# Patient Record
Sex: FEMALE | Race: NATIVE HAWAIIAN OR OTHER PACIFIC ISLANDER | NOT HISPANIC OR LATINO | Employment: FULL TIME | URBAN - METROPOLITAN AREA
[De-identification: names, ages, dates, MRNs, and addresses within clinical notes are randomized per-mention and may not be internally consistent; named-entity substitution may affect disease eponyms.]

---

## 2018-03-29 ENCOUNTER — HOSPITAL ENCOUNTER (INPATIENT)
Facility: MEDICAL CENTER | Age: 28
LOS: 1 days | End: 2018-03-30
Attending: OBSTETRICS & GYNECOLOGY | Admitting: OBSTETRICS & GYNECOLOGY
Payer: COMMERCIAL

## 2018-03-29 LAB
ALBUMIN SERPL BCP-MCNC: 3.4 G/DL (ref 3.2–4.9)
ALBUMIN/GLOB SERPL: 1.1 G/DL
ALP SERPL-CCNC: 215 U/L (ref 30–99)
ALT SERPL-CCNC: 9 U/L (ref 2–50)
ANION GAP SERPL CALC-SCNC: 10 MMOL/L (ref 0–11.9)
AST SERPL-CCNC: 18 U/L (ref 12–45)
BASOPHILS # BLD AUTO: 0.2 % (ref 0–1.8)
BASOPHILS # BLD: 0.03 K/UL (ref 0–0.12)
BILIRUB SERPL-MCNC: 0.5 MG/DL (ref 0.1–1.5)
BUN SERPL-MCNC: 8 MG/DL (ref 8–22)
CALCIUM SERPL-MCNC: 8.8 MG/DL (ref 8.5–10.5)
CHLORIDE SERPL-SCNC: 104 MMOL/L (ref 96–112)
CO2 SERPL-SCNC: 20 MMOL/L (ref 20–33)
CREAT SERPL-MCNC: 0.5 MG/DL (ref 0.5–1.4)
EOSINOPHIL # BLD AUTO: 0.13 K/UL (ref 0–0.51)
EOSINOPHIL NFR BLD: 1 % (ref 0–6.9)
ERYTHROCYTE [DISTWIDTH] IN BLOOD BY AUTOMATED COUNT: 40.9 FL (ref 35.9–50)
ERYTHROCYTE [DISTWIDTH] IN BLOOD BY AUTOMATED COUNT: 41.3 FL (ref 35.9–50)
GLOBULIN SER CALC-MCNC: 3 G/DL (ref 1.9–3.5)
GLUCOSE SERPL-MCNC: 112 MG/DL (ref 65–99)
HCT VFR BLD AUTO: 29.5 % (ref 37–47)
HCT VFR BLD AUTO: 33.8 % (ref 37–47)
HGB BLD-MCNC: 11.2 G/DL (ref 12–16)
HGB BLD-MCNC: 9.8 G/DL (ref 12–16)
HOLDING TUBE BB 8507: NORMAL
IMM GRANULOCYTES # BLD AUTO: 0.05 K/UL (ref 0–0.11)
IMM GRANULOCYTES NFR BLD AUTO: 0.4 % (ref 0–0.9)
LYMPHOCYTES # BLD AUTO: 2.21 K/UL (ref 1–4.8)
LYMPHOCYTES NFR BLD: 16.9 % (ref 22–41)
MCH RBC QN AUTO: 26.2 PG (ref 27–33)
MCH RBC QN AUTO: 26.5 PG (ref 27–33)
MCHC RBC AUTO-ENTMCNC: 33.1 G/DL (ref 33.6–35)
MCHC RBC AUTO-ENTMCNC: 33.2 G/DL (ref 33.6–35)
MCV RBC AUTO: 79.2 FL (ref 81.4–97.8)
MCV RBC AUTO: 79.7 FL (ref 81.4–97.8)
MONOCYTES # BLD AUTO: 0.58 K/UL (ref 0–0.85)
MONOCYTES NFR BLD AUTO: 4.4 % (ref 0–13.4)
NEUTROPHILS # BLD AUTO: 10.07 K/UL (ref 2–7.15)
NEUTROPHILS NFR BLD: 77.1 % (ref 44–72)
NRBC # BLD AUTO: 0 K/UL
NRBC BLD-RTO: 0 /100 WBC
PLATELET # BLD AUTO: 210 K/UL (ref 164–446)
PLATELET # BLD AUTO: 221 K/UL (ref 164–446)
PMV BLD AUTO: 12.3 FL (ref 9–12.9)
PMV BLD AUTO: 12.3 FL (ref 9–12.9)
POTASSIUM SERPL-SCNC: 3.7 MMOL/L (ref 3.6–5.5)
PROT SERPL-MCNC: 6.4 G/DL (ref 6–8.2)
RBC # BLD AUTO: 3.7 M/UL (ref 4.2–5.4)
RBC # BLD AUTO: 4.27 M/UL (ref 4.2–5.4)
SODIUM SERPL-SCNC: 134 MMOL/L (ref 135–145)
URATE SERPL-MCNC: 5.2 MG/DL (ref 1.9–8.2)
WBC # BLD AUTO: 13.1 K/UL (ref 4.8–10.8)
WBC # BLD AUTO: 14.7 K/UL (ref 4.8–10.8)

## 2018-03-29 PROCEDURE — 85025 COMPLETE CBC W/AUTO DIFF WBC: CPT

## 2018-03-29 PROCEDURE — 85027 COMPLETE CBC AUTOMATED: CPT

## 2018-03-29 PROCEDURE — 84550 ASSAY OF BLOOD/URIC ACID: CPT

## 2018-03-29 PROCEDURE — 770002 HCHG ROOM/CARE - OB PRIVATE (112)

## 2018-03-29 PROCEDURE — 304965 HCHG RECOVERY SERVICES

## 2018-03-29 PROCEDURE — 36415 COLL VENOUS BLD VENIPUNCTURE: CPT

## 2018-03-29 PROCEDURE — 10907ZC DRAINAGE OF AMNIOTIC FLUID, THERAPEUTIC FROM PRODUCTS OF CONCEPTION, VIA NATURAL OR ARTIFICIAL OPENING: ICD-10-PCS | Performed by: OBSTETRICS & GYNECOLOGY

## 2018-03-29 PROCEDURE — 700105 HCHG RX REV CODE 258

## 2018-03-29 PROCEDURE — 700111 HCHG RX REV CODE 636 W/ 250 OVERRIDE (IP)

## 2018-03-29 PROCEDURE — 59409 OBSTETRICAL CARE: CPT

## 2018-03-29 PROCEDURE — 700111 HCHG RX REV CODE 636 W/ 250 OVERRIDE (IP): Performed by: OBSTETRICS & GYNECOLOGY

## 2018-03-29 PROCEDURE — 80053 COMPREHEN METABOLIC PANEL: CPT

## 2018-03-29 RX ORDER — SODIUM CHLORIDE, SODIUM LACTATE, POTASSIUM CHLORIDE, CALCIUM CHLORIDE 600; 310; 30; 20 MG/100ML; MG/100ML; MG/100ML; MG/100ML
INJECTION, SOLUTION INTRAVENOUS
Status: COMPLETED
Start: 2018-03-29 | End: 2018-03-29

## 2018-03-29 RX ORDER — DOCUSATE SODIUM 100 MG/1
100 CAPSULE, LIQUID FILLED ORAL 2 TIMES DAILY PRN
Status: DISCONTINUED | OUTPATIENT
Start: 2018-03-29 | End: 2018-03-30 | Stop reason: HOSPADM

## 2018-03-29 RX ORDER — MISOPROSTOL 200 UG/1
600 TABLET ORAL
Status: DISCONTINUED | OUTPATIENT
Start: 2018-03-29 | End: 2018-03-30 | Stop reason: HOSPADM

## 2018-03-29 RX ORDER — SODIUM CHLORIDE, SODIUM LACTATE, POTASSIUM CHLORIDE, CALCIUM CHLORIDE 600; 310; 30; 20 MG/100ML; MG/100ML; MG/100ML; MG/100ML
INJECTION, SOLUTION INTRAVENOUS PRN
Status: DISCONTINUED | OUTPATIENT
Start: 2018-03-29 | End: 2018-03-30 | Stop reason: HOSPADM

## 2018-03-29 RX ORDER — OXYCODONE HYDROCHLORIDE AND ACETAMINOPHEN 5; 325 MG/1; MG/1
1 TABLET ORAL EVERY 4 HOURS PRN
Status: DISCONTINUED | OUTPATIENT
Start: 2018-03-29 | End: 2018-03-30 | Stop reason: HOSPADM

## 2018-03-29 RX ORDER — IBUPROFEN 600 MG/1
600 TABLET ORAL EVERY 6 HOURS PRN
Status: DISCONTINUED | OUTPATIENT
Start: 2018-03-29 | End: 2018-03-30 | Stop reason: HOSPADM

## 2018-03-29 RX ADMIN — Medication 2000 ML/HR: at 09:29

## 2018-03-29 RX ADMIN — Medication 125 ML/HR: at 10:49

## 2018-03-29 RX ADMIN — SODIUM CHLORIDE, POTASSIUM CHLORIDE, SODIUM LACTATE AND CALCIUM CHLORIDE 1000 ML: 600; 310; 30; 20 INJECTION, SOLUTION INTRAVENOUS at 07:10

## 2018-03-29 ASSESSMENT — PATIENT HEALTH QUESTIONNAIRE - PHQ9
2. FEELING DOWN, DEPRESSED, IRRITABLE, OR HOPELESS: NOT AT ALL
1. LITTLE INTEREST OR PLEASURE IN DOING THINGS: NOT AT ALL
SUM OF ALL RESPONSES TO PHQ9 QUESTIONS 1 AND 2: 0

## 2018-03-29 ASSESSMENT — LIFESTYLE VARIABLES
DO YOU DRINK ALCOHOL: NO
EVER_SMOKED: NEVER
EVER_SMOKED: NEVER
ALCOHOL_USE: NO

## 2018-03-29 ASSESSMENT — PAIN SCALES - GENERAL
PAINLEVEL_OUTOF10: 1
PAINLEVEL_OUTOF10: 0
PAINLEVEL_OUTOF10: 5
PAINLEVEL_OUTOF10: 0
PAINLEVEL_OUTOF10: 2

## 2018-03-29 NOTE — H&P
IDENTIFICATION:  This is a 28-year-old  2, para 1-0-0-1 with an EGA of   39-/7, who presents with a chief complaint of uterine contractions.    HISTORY OF PRESENT ILLNESS:  This is a patient of Dr. Parmar.  She has gotten   good prenatal care.  Prenatal care has been uncomplicated.  She had a   quad screen with elevated osd with pregnancy.  She is beta strep negative.  She presents today   at 39-7 with an EDC of , complaining of uterine contractions,   denies spontaneous rupture of membranes or vaginal bleeding, admits good fetal   movement, denies symptoms of PIH, headaches, visual changes or epigastric   pain.  Here in labor and delivery, fetal heart tracings reactive, category 1,   she is mana regularly.  Her cervix is 6, 90, -1 to 0 station.  Head is   well applied to the cervix.  She has been AROM clear.  She is not requesting   pain control at this time.  She has no other complaints.  No nausea, vomiting,   fever, chills, change in bowel or bladder habits.  She admits good fetal   movement.  Denies symptoms of PIH, headaches, visual changes or epigastric   pain.  She is quite excited to be here on labor and delivery.    OBSTETRIC HISTORY:  Significant for previous vaginal delivery.    GYNECOLOGIC HISTORY:  Denies STDs or abnormal Paps.    PAST MEDICAL HISTORY:  ALLERGIES:  None.    CURRENT MEDICATIONS:  Prenatal vitamins.    MEDICAL PROBLEMS:  None.    PAST SURGICAL HISTORY:  Negative.    SOCIAL HISTORY:  Denies alcohol, tobacco or drug use.    FAMILY HISTORY:  Noncontributory.    REVIEW OF SYSTEMS:  Review of systems x12 is negative per AMA standards   available in chart.    LABORATORY DATA:  She is Rh positive.  Her 1-hour was 111.  Her quad was   elevated for increased risk of OSD.  She had normal ultrasound.  She is   rubella immune.  She is beta strep negative.    PHYSICAL EXAMINATION:  VITAL SIGNS:  Patient is afebrile.  Vital signs within normal limits.  Fetal   heart tracings  reactive, category 1.  She is mana regularly.  GENERAL:  She is awake, alert, uncomfortable with contractions, no apparent   distress.  NECK:  Supple.  HEART:  Regular.  CHEST:  Clear.  BREASTS:  Symmetrical.  ABDOMEN:  Soft, gravid, pannus, nontender.  Positive bowel sounds x4.  EXTREMITIES:  Negative.  GYNECOLOGIC:  As above.    ASSESSMENT:  1.  At this time is pregnancy at 39-1/7 weeks.  2.  Labor.  3.  Elevated open spinal defect on quad screen 1 in 206 with normal   ultrasound.    PLAN:  At this time:  1.  Admit.  2.  Fetal heart tone and contraction monitoring.  3.  Pain control.  4.  Pitocin if needed.  5.  Anticipate normal spontaneous vaginal delivery.       ____________________________________     MD LEE Zhang / JAVIER    DD:  03/29/2018 07:55:09  DT:  03/29/2018 08:53:15    D#:  [id number]  Job#:  [id number]

## 2018-03-29 NOTE — PROGRESS NOTES
Patient voided post transfer. Patient educated to feed infant every 3 hours. Patient wants to wait on bath until father returns.

## 2018-03-29 NOTE — CARE PLAN
Problem: Altered physiologic condition related to immediate post-delivery state and potential for bleeding/hemorrhage  Goal: Patient physiologically stable as evidenced by normal lochia, palpable uterine involution and vital signs within normal limits    Intervention: Perform physical assessment and obtain vital signs on patient as directed in Intrapartum/Postpartum Standard of Care in Policy and Procedure manual  Patient transferred from labor and delivery without issue. Bands and cuddles verified at bedside #10. Patient denies prn pain medication upon transfer. Patient has family in room for assistance. Patient plans to breastfeed. Encouraged patient to call for assistance. Educated not to ambulate without assistance.

## 2018-03-29 NOTE — DELIVERY NOTE
DATE OF SERVICE:  2018    IDENTIFICATION:  This is a 28-year-old  2, para 1, who presents at   39-1/7 weeks complaining of uterine contractions.  Her cervix was 6 cm.  She   subsequently admitted with a category 1 strip, known beta strep negative.  She   was AROM clear.  She progressed over normal labor curve to complete with an   overall reassuring fetal heart tracing.  At complete, she was able to push the   baby down to a +3 station, extend the baby's head and delivered   atraumatically.  The nares and mouth were bulb suctioned.  There was no nuchal   cord.  The shoulders and body followed without complication.  The cord was   clamped and cut.  Cord gases were held.  The infant was handed off to awaiting   nursing team.  At this point, the placenta delivered intact 3-vessel cord.    The uterus firmed up nicely after 20 units of Pitocin.  Cervix was intact.    There were no lacerations.  Estimated blood loss was 300 mL.  The patient and   baby to recovery room in stable condition.    FINDINGS:  Include a male infant with Apgars of 8 and 8.  There were no   complications.       ____________________________________     MD LEE Zhang / JAVIER    DD:  2018 09:35:00  DT:  2018 16:36:00    D#:  [id number]  Job#:  [id number]

## 2018-03-29 NOTE — CONSULTS
Lactation Note:    Initial Consult    Met with MOB who states this is her second child and breast fed her first baby for 10 months.  Infant was born at 0924 this morning and is approximately 5.5 hours old.  MOB states infant has latched successfully onto the breast x 1 time this afternoon.  MOB declines pain and tissue breakdown to nipples/breast with breastfeeding.  Assistance offered with latching infant onto breast, but MOB declines at this time.    Encouraged MOB to feed infant on demand per feeding cues, but not to let infant go more than 3 hours without a feed.    Discussed signs of successful milk transfer and what to expect with breastfeeding in the first 12-24 hours following delivery.    MOB has [employer] medical insurance and states has a personal breast pump for home use.      Informed MOB on the outpatient lactation assistance available to her through the [facility] and invited her to attend the breastfeeding support group.    MOB verbalized understanding of all information provided to her and denies having any further questions at this time.  Encouraged to call for assistance as needed.

## 2018-03-29 NOTE — PROGRESS NOTES
0700: report received from Tutu Phillip. POC discussed, care assumed. IV started, admission completed.   0748: Dr. Gooden at bedside, SVE: /0, AROM: clear   0839: Dr. Gooden at bedside, SVE: 8, 0840: SVE:9  0915: pt states she is feeling pressure, Dr. Gooden notified, SVE: 920: Dr. Gooden at bedside: SVE: complete   0924: , viable baby boy    Recovery   Fundus: firm/ light/ at U  0949: pt up to bathroom, voided  1120: pt up to bathroom, sarita care performed, gown changed, pt transferred to postpartum, report given to Eliza PHILLIP

## 2018-03-29 NOTE — CARE PLAN
Problem: Infection  Goal: Will remain free from infection    Intervention: Implement standard precautions and perform hand washing before and after patient contact  Hand hygiene performed appropriately, education provided

## 2018-03-30 VITALS
SYSTOLIC BLOOD PRESSURE: 109 MMHG | HEIGHT: 66 IN | BODY MASS INDEX: 34.07 KG/M2 | HEART RATE: 81 BPM | WEIGHT: 212 LBS | TEMPERATURE: 98.1 F | OXYGEN SATURATION: 97 % | RESPIRATION RATE: 18 BRPM | DIASTOLIC BLOOD PRESSURE: 82 MMHG

## 2018-03-30 RX ORDER — IBUPROFEN 600 MG/1
600 TABLET ORAL EVERY 6 HOURS PRN
Qty: 30 TAB | Refills: 1 | Status: SHIPPED | OUTPATIENT
Start: 2018-03-30 | End: 2019-11-06

## 2018-03-30 RX ORDER — FERROUS GLUCONATE 324(38)MG
324 TABLET ORAL
Qty: 30 TAB | Refills: 0 | Status: SHIPPED | OUTPATIENT
Start: 2018-03-30 | End: 2018-03-30

## 2018-03-30 RX ORDER — FERROUS GLUCONATE 324(38)MG
324 TABLET ORAL
Qty: 30 TAB | Refills: 0 | Status: SHIPPED | OUTPATIENT
Start: 2018-03-30 | End: 2019-11-06

## 2018-03-30 RX ORDER — PSEUDOEPHEDRINE HCL 30 MG
100 TABLET ORAL 2 TIMES DAILY PRN
Qty: 60 CAP | Refills: 1 | Status: SHIPPED | OUTPATIENT
Start: 2018-03-30 | End: 2019-11-06

## 2018-03-30 ASSESSMENT — PAIN SCALES - GENERAL
PAINLEVEL_OUTOF10: 0

## 2018-03-30 ASSESSMENT — LIFESTYLE VARIABLES: EVER_SMOKED: NEVER

## 2018-03-30 NOTE — DISCHARGE SUMMARY
Discharge Summary:      Renetta Nichols      Admit Date:   3/29/2018  Discharge Date:  3/30/2018     Admitting diagnosis:  Pregnancy  Labor and delivery, indication for care  Discharge Diagnosis: Status post vaginal, spontaneous.  Pregnancy Complications: none  Tubal Ligation:  no        History:  Past Medical History:   Diagnosis Date   • Pregnant      OB History    Para Term  AB Living   2 1           SAB TAB Ectopic Molar Multiple Live Births                    # Outcome Date GA Lbr Ashok/2nd Weight Sex Delivery Anes PTL Lv   2             1 Para                    Patient has no known allergies.  There are no active problems to display for this patient.       Hospital Course:   28 y.o. , now para 2, was admitted with the above mentioned diagnosis, underwent Active Labor, vaginal, spontaneous. Patient postpartum course was unremarkable, with progressive advancement in diet , ambulation and toleration of oral analgesia. Patient without complaints today and desires discharge.      Vitals:    18 0738 18 0800 18 1230 18 2000   BP:  138/87 100/67 105/76   Pulse:  71 87 91   Resp:   18 19   Temp: 36.8 °C (98.2 °F)  36.9 °C (98.4 °F) 37.3 °C (99.2 °F)   SpO2:   96% 93%   Weight:       Height:           Current Facility-Administered Medications   Medication Dose   • LR infusion     • miSOPROStol (CYTOTEC) tablet 600 mcg  600 mcg   • docusate sodium (COLACE) capsule 100 mg  100 mg   • ibuprofen (MOTRIN) tablet 600 mg  600 mg   • oxyCODONE-acetaminophen (PERCOCET) 5-325 MG per tablet 1 Tab  1 Tab   • oxytocin (PITOCIN) 20 UNITS/1000ML LR (postpartum)   mL/hr   • oxytocin (PITOCIN) 20 UNITS/1000ML LR (postpartum)  2,000 mL/hr       Exam:  Gen: NAD, AAO  Abdomen: Abdomen soft, non-tender. No masses,  No organomegally, FF @ U-1. No rebound/guarding.  Fundus Tender: No  Incision: none  Extremity: no edema, no redness or tenderness in the calves or thighs, 2+DPP, Homans sign  is negative, no sign of DVT      Labs:  Recent Labs      03/29/18   0706  03/29/18 2022   WBC  13.1*  14.7*   RBC  4.27  3.70*   HEMOGLOBIN  11.2*  9.8*   HEMATOCRIT  33.8*  29.5*   MCV  79.2*  79.7*   MCH  26.2*  26.5*   MCHC  33.1*  33.2*   RDW  40.9  41.3   PLATELETCT  221  210   MPV  12.3  12.3        Activity:   Discharge to home  Pelvic Rest x 6 weeks    Assessment:  normal postpartum course  Discharge Assessment: Taking adequate diet and fluids     Follow up: 6wk with Corinne E Capurro, M.D.       Discharge Meds:   Current Outpatient Prescriptions   Medication Sig Dispense Refill   • ibuprofen (MOTRIN) 600 MG Tab Take 1 Tab by mouth every 6 hours as needed. 30 Tab 1   • docusate sodium 100 MG Cap Take 100 mg by mouth 2 times a day as needed for Constipation. 60 Cap 1   • ferrous gluconate (FERGON) 324 (38 Fe) MG Tab Take 1 Tab by mouth every morning with breakfast. 30 Tab 0       Jersey Martinez M.D.

## 2018-03-30 NOTE — DISCHARGE INSTRUCTIONS
POSTPARTUM DISCHARGE INSTRUCTIONS FOR MOM    YOB: 1990   Age: 28 y.o.               Admit Date: 3/29/2018     Discharge Date: 3/30/2018  Attending Doctor:  Corinne E Capurro, M.D.                  Allergies:  Patient has no known allergies.    Discharged to home by car. Discharged via wheelchair, hospital escort: Yes.  Special equipment needed: Not Applicable  Belongings with: Personal  Be sure to schedule a follow-up appointment with your primary care doctor or any specialists as instructed.     Discharge Plan:   Diet Plan: Discussed  Activity Level: Discussed  Confirmed Follow up Appointment: Patient to Call and Schedule Appointment  Confirmed Symptoms Management: Discussed  Medication Reconciliation Updated: Yes  Influenza Vaccine Indication: Not indicated: Previously immunized this influenza season and > 8 years of age    REASONS TO CALL YOUR OBSTETRICIAN:  1.   Persistent fever or shaking chills (Temperature higher than 100.4)  2.   Heavy bleeding (soaking more than 1 pad per hour); Passing clots  3.   Foul odor from vagina  4.   Mastitis (Breast infection; breast pain, chills, fever, redness)  5.   Urinary pain, burning or frequency  6.   Episiotomy infection  7.   Abdominal incision infection  8.   Severe depression longer than 24 hours    HAND WASHING  · Prior to handling the baby.  · Before breastfeeding or bottle feeding baby.  · After using the bathroom or changing the baby's diaper.    WOUND CARE  Ask your physician for additional care instructions.  In general:    ·  Incision:      · Keep clean and dry.    · Do NOT lift anything heavier than your baby for up to 6 weeks.    · There should not be any opening or pus.      VAGINAL CARE  · Nothing inside vagina for 6 weeks: no sexual intercourse, tampons or douching.  · Bleeding may continue for 2-4 weeks.  Amount may vary.    · Call your physician for heavy bleeding which means soaking more than 1 pad per hour    BIRTH CONTROL  · It is  "possible to become pregnant at any time after delivery and while breastfeeding.  · Plan to discuss a method of birth control with your physician at your follow up visit. visit.    DIET AND ELIMINATION  · Eating more fiber (bran cereal, fruits, and vegetables) and drinking plenty of fluids will help to avoid constipation.  · Urinary frequency after childbirth is normal.    POSTPARTUM BLUES  During the first few days after birth, you may experience a sense of the \"blues\" which may include impatience, irritability or even crying.  These feeling come and go quickly.  However, as many as 1 in 10 women experience emotional symptoms known as postpartum depression.    Postpartum depression:  May start as early as the second or third day after delivery or take several weeks or months to develop.  Symptoms of \"blues\" are present, but are more intense:  Crying spells; loss of appetite; feelings of hopelessness or loss of control; fear of touching the baby; over concern or no concern at all about the baby; little or no concern about your own appearance/caring for yourself; and/or inability to sleep or excessive sleeping.  Contact your physician if you are experiencing any of these symptoms.    Crisis Hotline:  · [place] Crisis Hotline:  1-244-YLFGWJJ  Or 1-797.253.3450  · [place] Crisis Hotline:  1-847.741.1694  Or 182-126-0328    PREVENTING SHAKEN BABY:  If you are angry or stressed, PUT THE BABY IN THE CRIB, step away, take some deep breaths, and wait until you are calm to care for the baby.  DO NOT SHAKE THE BABY.  You are not alone, call a supporter for help.    · Crisis [facility] Center 24/7 crisis line 744-846-3081 or 1-876.805.8842  · You can also text them, text \"ANSWER\" to 561598    QUIT SMOKING/TOBACCO USE:  I understand the use of any tobacco products increases my chance of suffering from future heart disease and could cause other illnesses which may shorten my life. Quitting the use of tobacco products is the single most " important thing I can do to improve my health. For further information on smoking / tobacco cessation call a Toll Free Quit Line at 1-859.452.6393 (*[employer] [place]) or 1-131.171.9526 (American Lung Association) or you can access the web based program at www.lungusa.org.    · [place] Tobacco Users Help Line:  (844) 373-2877       Toll Free: 1-673.235.2609  · Quit Tobacco Program [facility] (468)070-0531    DEPRESSION / SUICIDE RISK:  As you are discharged from this [facility], it is important to learn how to keep safe from harming yourself.    Recognize the warning signs:  · Abrupt changes in personality, positive or negative- including increase in energy   · Giving away possessions  · Change in eating patterns- significant weight changes-  positive or negative  · Change in sleeping patterns- unable to sleep or sleeping all the time   · Unwillingness or inability to communicate  · Depression  · Unusual sadness, discouragement and loneliness  · Talk of wanting to die  · Neglect of personal appearance   · Rebelliousness- reckless behavior  · Withdrawal from people/activities they love  · Confusion- inability to concentrate     If you or a loved one observes any of these behaviors or has concerns about self-harm, here's what you can do:  · Talk about it- your feelings and reasons for harming yourself  · Remove any means that you might use to hurt yourself (examples: pills, rope, extension cords, firearm)  · Get professional help from the community (Mental Health, Substance Abuse, psychological counseling)  · Do not be alone:Call your Safe Contact- someone whom you trust who will be there for you.  · Call your local CRISIS HOTLINE 348-5581 or 727-046-9852  · Call your local Children's Mobile Crisis Response Team [place] (740) 515-1524 or www.[employer]  · Call the toll free National Suicide Prevention Hotlines   · National Suicide Prevention Lifeline 660-420-DTSG  (5996)  · [facility] 800-SUICIDE (241-4856)    DISCHARGE SURVEY:  Thank you for choosing [facility].  We hope we provided you with very good care.  You may be receiving a survey in the mail.  Please fill it out.  Your opinion is valuable to us.    ADDITIONAL EDUCATIONAL MATERIALS GIVEN TO PATIENT:        My signature on this form indicates that:  1.  I have reviewed and understand the above information  2.  My questions regarding this information have been answered to my satisfaction.  3.  I have formulated a plan with my discharge nurse to obtain my prescribed medication for home.

## 2018-03-30 NOTE — PROGRESS NOTES
Assessment complete. VSS. Fundus firm, lochia light. Pt denies pain at this time, will medicate with PRN pain meds per MAR as needed. FOB and family at bedside bonding with pt and baby. States voiding without difficulty. POC discussed. Encouraged to call with needs. Call light in place

## 2018-03-30 NOTE — PROGRESS NOTES
0700 - Bedside report received from JASSON Garcia. Patient care assumed. Chart and orders reviewed  1000 - Pt assessment complete, wnl. Fundus firm with minimal discharge. Pt ambulating to bathroom and voiding without difficulty. Patient denies any dizziness or lightheadedness at this time. Patient denies any calf pain or tenderness at this time. Reviewed use of emergency light. Plan of care discussed with patient for the day. Pain medication plan discussed with patient; patient requesting not to be woken up if pain medications are available; patient states she will call if PRN pain medication is wanted. All questions/concerns addressed at this time. Encouraged to call with needs, will monitor

## 2018-03-30 NOTE — PROGRESS NOTES
Lactation Note:    MOB states breastfeeding is going well and declines assistance with breastfeeding at this time.  Denies pain and tissue breakdown at nipples/breast with breastfeeding.  MOB continues to nurse infant every 2-3 hours and reiterated that infant should not be allowed to go more than 3 hours without a feed.  MOB verbalized understanding.      Reviewed signs of successful milk transfer with MOB.    Infant's weight loss down 3.5% at 11.5 hours.  Infant voiding and stooling within defined limits.    TIFFANIE is a participant of the [facility] program.  She is aware of the outpatient lactation assistance available to her through [facility] and the [facility].    MOB verbalized understanding of all information given to her and denies having any further questions at this time.  Encouraged to call for assistance as needed.

## 2018-03-30 NOTE — CARE PLAN
Problem: Altered physiologic condition related to immediate post-delivery state and potential for bleeding/hemorrhage  Goal: Patient physiologically stable as evidenced by normal lochia, palpable uterine involution and vital signs within normal limits  Outcome: PROGRESSING AS EXPECTED  VSS. Fundus firm. Lochia light.    Problem: Alteration in comfort related to episiotomy, vaginal repair and/or after birth pains  Goal: Patient verbalizes acceptable pain level  Outcome: PROGRESSING AS EXPECTED  Pt denies pain at this time. Will medicate with PRN pain meds per MAR as needed throughout shift.

## 2019-07-25 ENCOUNTER — TELEPHONE (OUTPATIENT)
Dept: SCHEDULING | Facility: IMAGING CENTER | Age: 29
End: 2019-07-25

## 2019-09-09 ENCOUNTER — OFFICE VISIT (OUTPATIENT)
Dept: MEDICAL GROUP | Facility: LAB | Age: 29
End: 2019-09-09
Payer: COMMERCIAL

## 2019-09-09 VITALS
HEART RATE: 64 BPM | HEIGHT: 64 IN | TEMPERATURE: 98.2 F | WEIGHT: 182.8 LBS | RESPIRATION RATE: 18 BRPM | OXYGEN SATURATION: 98 % | SYSTOLIC BLOOD PRESSURE: 108 MMHG | BODY MASS INDEX: 31.21 KG/M2 | DIASTOLIC BLOOD PRESSURE: 64 MMHG

## 2019-09-09 DIAGNOSIS — Z00.00 WELL ADULT EXAM: ICD-10-CM

## 2019-09-09 DIAGNOSIS — E66.9 OBESITY (BMI 30-39.9): ICD-10-CM

## 2019-09-09 PROCEDURE — 99385 PREV VISIT NEW AGE 18-39: CPT | Performed by: NURSE PRACTITIONER

## 2019-09-09 SDOH — HEALTH STABILITY: MENTAL HEALTH: HOW OFTEN DO YOU HAVE A DRINK CONTAINING ALCOHOL?: NOT ASKED

## 2019-09-09 ASSESSMENT — PATIENT HEALTH QUESTIONNAIRE - PHQ9: CLINICAL INTERPRETATION OF PHQ2 SCORE: 0

## 2019-09-09 NOTE — LETTER
[facility]  JEANMARIE CohenREliaN.  [address] [id number]  [facility] [address]  Fax: [phone]   Authorization for Release/Disclosure of   Protected Health Information   Name: RENETTA TRIPP : 1990 SSN: [id number]   Address: [address] [place] [address] Phone:    [phone] (home)    I authorize the entity listed below to release/disclose the PHI below to:   [facility]/COSMO CohenP.RMYRIAM and ADRIANO Cohen   Provider or Entity Name: [facility]   Address   City, [facility], [facility]   Phone:[phone]    Fax:[phone]   Reason for request: continuity of care   Information to be released:    [  ] LAST COLONOSCOPY,  including any PATH REPORT and follow-up  [  ] LAST FIT/COLOGUARD RESULT [  ] LAST DEXA  [  ] LAST MAMMOGRAM  [x] LAST PAP  [  ] LAST LABS [  ] RETINA EXAM REPORT  [  ] IMMUNIZATION RECORDS  [  ] Release all info      [  ] Check here and initial the line next to each item to release ALL health information INCLUDING  _____ Care and treatment for drug and / or alcohol abuse  _____ HIV testing, infection status, or AIDS  _____ Genetic Testing    DATES OF SERVICE OR TIME PERIOD TO BE DISCLOSED: _____________  I understand and acknowledge that:  * This Authorization may be revoked at any time by you in writing, except if your health information has already been used or disclosed.  * Your health information that will be used or disclosed as a result of you signing this authorization could be re-disclosed by the recipient. If this occurs, your re-disclosed health information may no longer be protected by State or Federal laws.  * You may refuse to sign this Authorization. Your refusal will not affect your ability to obtain treatment.  * This Authorization becomes effective upon signing and will  on (date) __________.      If no date is indicated, this Authorization will  one (1) year from the signature date.    Name: Renetta Tripp    Signature:   Date:     9/9/2019       PLEASE FAX REQUESTED RECORDS BACK TO: [phone]

## 2019-10-30 LAB
25(OH)D3+25(OH)D2 SERPL-MCNC: 12.8 NG/ML (ref 30–100)
ALBUMIN SERPL-MCNC: 4.5 G/DL (ref 3.5–5.5)
ALBUMIN/GLOB SERPL: 1.6 {RATIO} (ref 1.2–2.2)
ALP SERPL-CCNC: 55 IU/L (ref 39–117)
ALT SERPL-CCNC: 17 IU/L (ref 0–32)
AST SERPL-CCNC: 21 IU/L (ref 0–40)
BASOPHILS # BLD AUTO: 0 X10E3/UL (ref 0–0.2)
BASOPHILS NFR BLD AUTO: 0 %
BILIRUB SERPL-MCNC: 0.3 MG/DL (ref 0–1.2)
BUN SERPL-MCNC: 8 MG/DL (ref 6–20)
BUN/CREAT SERPL: 12 (ref 9–23)
CALCIUM SERPL-MCNC: 9.1 MG/DL (ref 8.7–10.2)
CHLORIDE SERPL-SCNC: 107 MMOL/L (ref 96–106)
CHOLEST SERPL-MCNC: 161 MG/DL (ref 100–199)
CO2 SERPL-SCNC: 20 MMOL/L (ref 20–29)
CREAT SERPL-MCNC: 0.68 MG/DL (ref 0.57–1)
EOSINOPHIL # BLD AUTO: 0.2 X10E3/UL (ref 0–0.4)
EOSINOPHIL NFR BLD AUTO: 3 %
ERYTHROCYTE [DISTWIDTH] IN BLOOD BY AUTOMATED COUNT: 18 % (ref 12.3–15.4)
FERRITIN SERPL-MCNC: 6 NG/ML (ref 15–150)
GLOBULIN SER CALC-MCNC: 2.8 G/DL (ref 1.5–4.5)
GLUCOSE SERPL-MCNC: 90 MG/DL (ref 65–99)
HCT VFR BLD AUTO: 38.2 % (ref 34–46.6)
HDLC SERPL-MCNC: 73 MG/DL
HGB BLD-MCNC: 12 G/DL (ref 11.1–15.9)
IMM GRANULOCYTES # BLD AUTO: 0 X10E3/UL (ref 0–0.1)
IMM GRANULOCYTES NFR BLD AUTO: 0 %
IMMATURE CELLS  115398: ABNORMAL
LABORATORY COMMENT REPORT: NORMAL
LDLC SERPL CALC-MCNC: 76 MG/DL (ref 0–99)
LYMPHOCYTES # BLD AUTO: 2.7 X10E3/UL (ref 0.7–3.1)
LYMPHOCYTES NFR BLD AUTO: 43 %
MCH RBC QN AUTO: 23.6 PG (ref 26.6–33)
MCHC RBC AUTO-ENTMCNC: 31.4 G/DL (ref 31.5–35.7)
MCV RBC AUTO: 75 FL (ref 79–97)
MONOCYTES # BLD AUTO: 0.4 X10E3/UL (ref 0.1–0.9)
MONOCYTES NFR BLD AUTO: 6 %
MORPHOLOGY BLD-IMP: ABNORMAL
NEUTROPHILS # BLD AUTO: 3.1 X10E3/UL (ref 1.4–7)
NEUTROPHILS NFR BLD AUTO: 48 %
NRBC BLD AUTO-RTO: ABNORMAL %
PLATELET # BLD AUTO: 362 X10E3/UL (ref 150–450)
POTASSIUM SERPL-SCNC: 3.7 MMOL/L (ref 3.5–5.2)
PROT SERPL-MCNC: 7.3 G/DL (ref 6–8.5)
RBC # BLD AUTO: 5.09 X10E6/UL (ref 3.77–5.28)
SODIUM SERPL-SCNC: 142 MMOL/L (ref 134–144)
TRIGL SERPL-MCNC: 59 MG/DL (ref 0–149)
TSH SERPL DL<=0.005 MIU/L-ACNC: 1.58 UIU/ML (ref 0.45–4.5)
VLDLC SERPL CALC-MCNC: 12 MG/DL (ref 5–40)
WBC # BLD AUTO: 6.4 X10E3/UL (ref 3.4–10.8)

## 2019-11-06 ENCOUNTER — HOSPITAL ENCOUNTER (EMERGENCY)
Facility: MEDICAL CENTER | Age: 29
End: 2019-11-06
Attending: EMERGENCY MEDICINE
Payer: COMMERCIAL

## 2019-11-06 ENCOUNTER — APPOINTMENT (OUTPATIENT)
Dept: RADIOLOGY | Facility: MEDICAL CENTER | Age: 29
End: 2019-11-06
Attending: EMERGENCY MEDICINE
Payer: COMMERCIAL

## 2019-11-06 VITALS
BODY MASS INDEX: 29.62 KG/M2 | HEIGHT: 66 IN | WEIGHT: 184.3 LBS | RESPIRATION RATE: 16 BRPM | HEART RATE: 66 BPM | SYSTOLIC BLOOD PRESSURE: 107 MMHG | OXYGEN SATURATION: 96 % | DIASTOLIC BLOOD PRESSURE: 76 MMHG | TEMPERATURE: 98.9 F

## 2019-11-06 DIAGNOSIS — M54.50 LUMBAR BACK PAIN: ICD-10-CM

## 2019-11-06 DIAGNOSIS — N39.0 ACUTE UTI: ICD-10-CM

## 2019-11-06 LAB
APPEARANCE UR: CLEAR
BACTERIA #/AREA URNS HPF: ABNORMAL /HPF
BILIRUB UR QL STRIP.AUTO: NEGATIVE
COLOR UR: YELLOW
EPI CELLS #/AREA URNS HPF: ABNORMAL /HPF
GLUCOSE UR STRIP.AUTO-MCNC: NEGATIVE MG/DL
HCG UR QL: NEGATIVE
HYALINE CASTS #/AREA URNS LPF: ABNORMAL /LPF
KETONES UR STRIP.AUTO-MCNC: NEGATIVE MG/DL
LEUKOCYTE ESTERASE UR QL STRIP.AUTO: ABNORMAL
MICRO URNS: ABNORMAL
NITRITE UR QL STRIP.AUTO: NEGATIVE
PH UR STRIP.AUTO: 7 [PH] (ref 5–8)
PROT UR QL STRIP: NEGATIVE MG/DL
RBC # URNS HPF: ABNORMAL /HPF
RBC UR QL AUTO: NEGATIVE
SP GR UR STRIP.AUTO: 1.03
UROBILINOGEN UR STRIP.AUTO-MCNC: 0.2 MG/DL
WBC #/AREA URNS HPF: ABNORMAL /HPF

## 2019-11-06 PROCEDURE — 74176 CT ABD & PELVIS W/O CONTRAST: CPT

## 2019-11-06 PROCEDURE — 87086 URINE CULTURE/COLONY COUNT: CPT

## 2019-11-06 PROCEDURE — 81001 URINALYSIS AUTO W/SCOPE: CPT

## 2019-11-06 PROCEDURE — 81025 URINE PREGNANCY TEST: CPT

## 2019-11-06 PROCEDURE — 99284 EMERGENCY DEPT VISIT MOD MDM: CPT

## 2019-11-06 PROCEDURE — A9270 NON-COVERED ITEM OR SERVICE: HCPCS | Performed by: EMERGENCY MEDICINE

## 2019-11-06 PROCEDURE — 700102 HCHG RX REV CODE 250 W/ 637 OVERRIDE(OP): Performed by: EMERGENCY MEDICINE

## 2019-11-06 RX ORDER — SULFAMETHOXAZOLE AND TRIMETHOPRIM 800; 160 MG/1; MG/1
1 TABLET ORAL 2 TIMES DAILY
Qty: 14 TAB | Refills: 0 | Status: SHIPPED | OUTPATIENT
Start: 2019-11-06 | End: 2019-11-13

## 2019-11-06 RX ORDER — IBUPROFEN 600 MG/1
600 TABLET ORAL ONCE
Status: COMPLETED | OUTPATIENT
Start: 2019-11-06 | End: 2019-11-06

## 2019-11-06 RX ADMIN — IBUPROFEN 600 MG: 600 TABLET ORAL at 16:17

## 2019-11-06 SDOH — HEALTH STABILITY: MENTAL HEALTH: HOW OFTEN DO YOU HAVE A DRINK CONTAINING ALCOHOL?: MONTHLY OR LESS

## 2019-11-06 ASSESSMENT — LIFESTYLE VARIABLES: DO YOU DRINK ALCOHOL: NO

## 2019-11-07 NOTE — DISCHARGE INSTRUCTIONS
Your urine contains signs of a possible infection with white blood cells    A urine culture is being processed and will be back in 24 to 48 hours    We will give you an antibiotic for now in case this is an infection    For the pain, take Tylenol 650 mg every 4 hours and/or ibuprofen/Motrin 600 mg every 6 hours    Apply salon pas to the affected area    Return to the ER for fever, vomiting, increased pain, weakness, incontinence, numbness, or other concerns
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
## 2019-11-07 NOTE — ED NOTES
PT VU DCIs with strict return precautions and follow up w/pcp. PT to start abx pending urine culture. Pt left A&OX4 amb with steady gait to discharge. Pt left with .

## 2019-11-08 LAB
BACTERIA UR CULT: ABNORMAL
BACTERIA UR CULT: ABNORMAL
SIGNIFICANT IND 70042: ABNORMAL
SITE SITE: ABNORMAL
SOURCE SOURCE: ABNORMAL

## 2019-11-10 NOTE — ED NOTES
"ED Positive Culture Follow-up/Notification Note:   Date: 11/10/19    Patient seen in the ED on 11/6/2019 for left side lower back pain.   1. Lumbar back pain    2. Acute UTI      Discharge Medication List as of 11/6/2019  5:04 PM      START taking these medications    Details   sulfamethoxazole-trimethoprim (BACTRIM DS) 800-160 MG tablet Take 1 Tab by mouth 2 times a day for 7 days., Disp-14 Tab, R-0, Normal           Allergies: Patient has no known allergies.    Vitals:    11/06/19 1401 11/06/19 1426 11/06/19 1717   BP: 111/67  107/76   Pulse: 73  66   Resp: 15  16   Temp: 37.1 °C (98.8 °F)  37.2 °C (98.9 °F)   TempSrc:   Oral   SpO2: 95%  96%   Weight:  83.6 kg (184 lb 4.9 oz)    Height: 1.676 m (5' 6\")         Final cultures:   Results     Procedure Component Value Units Date/Time    URINE CULTURE(NEW) [[id number]]  (Abnormal) Collected:  11/06/19 1452    Order Status:  Completed Specimen:  Urine Updated:  11/08/19 0935     Significant Indicator POS     Source UR     Site -     Culture Result -      Probable Gardnerella vaginalis  >100,000 cfu/mL      URINALYSIS,CULTURE IF INDICATED [[id number]]  (Abnormal) Collected:  11/06/19 1452    Order Status:  Completed Specimen:  Urine Updated:  11/06/19 1524     Color Yellow     Character Clear     Specific Gravity 1.026     Ph 7.0     Glucose Negative mg/dL      Ketones Negative mg/dL      Protein Negative mg/dL      Bilirubin Negative     Urobilinogen, Urine 0.2     Nitrite Negative     Leukocyte Esterase Small     Occult Blood Negative     Micro Urine Req Microscopic          Plan:   G. Vaginalis is a common urogenital colonizer and not likely the true urinary pathogen.  Patient does not present with symptoms consistent with BV at time of visit. SMX-TMP will provide good empiric coverage against urinary pathogens. No need to change antimicrobials based on C&S data.      Addendum: Pt returned my call c/o s/s of BV. Discussed culture result and change in antibiotics with " patient, who will  new prescription at [facility].    New Rx:  Metronidazole 500 mg BID x 7 days #14, no refills - MD: Chaitanya per protocol    Kiersten Buckley, MauryD

## 2019-11-12 DIAGNOSIS — B96.89 BACTERIAL VAGINOSIS: ICD-10-CM

## 2019-11-12 DIAGNOSIS — N76.0 BACTERIAL VAGINOSIS: ICD-10-CM

## 2019-11-12 RX ORDER — METRONIDAZOLE 500 MG/1
500 TABLET ORAL 2 TIMES DAILY
Qty: 14 TAB | Refills: 0 | Status: SHIPPED | OUTPATIENT
Start: 2019-11-12 | End: 2019-11-19

## 2020-11-14 ENCOUNTER — TELEMEDICINE (OUTPATIENT)
Dept: TELEHEALTH | Facility: TELEMEDICINE | Age: 30
End: 2020-11-14
Payer: COMMERCIAL

## 2020-11-14 ENCOUNTER — HOSPITAL ENCOUNTER (OUTPATIENT)
Dept: LAB | Facility: MEDICAL CENTER | Age: 30
End: 2020-11-14
Attending: NURSE PRACTITIONER
Payer: COMMERCIAL

## 2020-11-14 VITALS — BODY MASS INDEX: 32.14 KG/M2 | WEIGHT: 200 LBS | HEIGHT: 66 IN

## 2020-11-14 DIAGNOSIS — R05.9 COUGH: ICD-10-CM

## 2020-11-14 DIAGNOSIS — Z20.822 ENCOUNTER BY TELEHEALTH FOR SUSPECTED COVID-19: ICD-10-CM

## 2020-11-14 PROCEDURE — U0003 INFECTIOUS AGENT DETECTION BY NUCLEIC ACID (DNA OR RNA); SEVERE ACUTE RESPIRATORY SYNDROME CORONAVIRUS 2 (SARS-COV-2) (CORONAVIRUS DISEASE [COVID-19]), AMPLIFIED PROBE TECHNIQUE, MAKING USE OF HIGH THROUGHPUT TECHNOLOGIES AS DESCRIBED BY CMS-2020-01-R: HCPCS

## 2020-11-14 PROCEDURE — C9803 HOPD COVID-19 SPEC COLLECT: HCPCS

## 2020-11-14 PROCEDURE — 99214 OFFICE O/P EST MOD 30 MIN: CPT | Mod: 95,CR,CS | Performed by: NURSE PRACTITIONER

## 2020-11-14 ASSESSMENT — ENCOUNTER SYMPTOMS
ABDOMINAL PAIN: 0
FEVER: 0
CONSTIPATION: 0
SORE THROAT: 1
HEADACHES: 0
CHILLS: 0
VOMITING: 0
SHORTNESS OF BREATH: 0
MYALGIAS: 0
WHEEZING: 0
COUGH: 1
NAUSEA: 0
DIARRHEA: 0

## 2020-11-14 ASSESSMENT — FIBROSIS 4 INDEX: FIB4 SCORE: 0.42

## 2020-11-14 NOTE — PROGRESS NOTES
"Telemedicine Visit: Established Patient     This evaluation was conducted via [employer] secure and encrypted videoconferencing technology. VV in [place]    Subjective:   CC: COVID symptoms  Renetta Nichols is a 30 y.o. female presenting for evaluation and management of covid symptoms including sore throat, congestion, loss of taste and smell and dry cough. symptoms started 5 days ago. She has seasonal allergies has taken some allergy medications with didn't help. Denies known exposure to covid     Review of Systems   Constitutional: Negative for chills, fever and malaise/fatigue.   HENT: Positive for congestion and sore throat.    Respiratory: Positive for cough. Negative for shortness of breath and wheezing.    Gastrointestinal: Negative for abdominal pain, constipation, diarrhea, nausea and vomiting.   Musculoskeletal: Negative for myalgias.   Neurological: Negative for headaches.        Current medicines (including changes today)  Medications:    • This patient does not have an active medication from one of the medication groupers.    Allergies: Patient has no known allergies.    Problem List: Renetta Nichols has Obesity (BMI 30-39.9) on their problem list.    Surgical History:  No past surgical history on file.    Past Social Hx: Renetta Nichols  reports that she has never smoked. She has never used smokeless tobacco. She reports current alcohol use. She reports that she does not use drugs.     Past Family Hx:  Renetta Nichols family history includes Diabetes in her paternal aunt; No Known Problems in her brother, father, mother, and sister.     Problem list, medications, and allergies reviewed by myself today in Epic.     Objective:   Ht 1.676 m (5' 6\")   Wt 90.7 kg (200 lb)   BMI 32.28 kg/m²  Not taken due to virtual visit.    Physical Exam:  Constitutional: Alert, no distress, well-groomed.  Skin: No rashes in visible areas.  Eye: Round. Conjunctiva clear, lids normal. No icterus.   ENMT: Lips pink without lesions, good " dentition, moist mucous membranes. Phonation normal.  Neck: No masses, no thyromegaly. Moves freely without pain.  CV: Pulse as reported by patient is normal  Respiratory: Unlabored respiratory effort, no cough or audible wheeze  Psych: Alert and oriented x3, normal affect and mood.       Assessment and Plan:   The following treatment plan was discussed:     1. Encounter by telehealth for suspected COVID-19  - COVID/SARS COV-2 PCR; Future    2. Cough  - COVID/SARS COV-2 PCR; Future    Discussed Physical examination findings with patient are likely viral in etiology and could be due to COVID so will perform testing. Advised patient to remain in self isolation until cleared by a negative test or the health department, if they test positive. Will call patient with results. Strict ER precautions provided for worsening symptoms including SOB, difficulty breathing or high fever unable to be controlled by OTC tylenol or NSAIDS. Viral illness are largely self limiting and patient should increase fluids using electrolyte enriched beverages as well as OTC medications such as tylenol and ibuprofen per 's instructions.     Will go to  for COVID testing    Will release results to [email or web address]    Follow-up: No follow-ups on file.          Please note that this dictation was created using voice recognition software. I have made every reasonable attempt to correct obvious errors, but I expect that there are errors of grammar and possibly content that I did not discover before finalizing the note.    Note electronically signed by WINSTON Castro

## 2020-11-16 LAB
COVID ORDER STATUS COVID19: NORMAL
SARS-COV-2 RNA RESP QL NAA+PROBE: NOTDETECTED
SPECIMEN SOURCE: NORMAL

## 2021-03-10 ENCOUNTER — NURSE TRIAGE (OUTPATIENT)
Dept: HEALTH INFORMATION MANAGEMENT | Facility: OTHER | Age: 31
End: 2021-03-10

## 2021-03-10 NOTE — TELEPHONE ENCOUNTER
"    Reason for Disposition  • Nasal discharge present > 10 days    Additional Information  • Negative: Sounds like a life-threatening emergency to the triager  • Negative: Difficulty breathing, and not from stuffy nose (e.g., not relieved by cleaning out the nose)  • Negative: SEVERE headache and has fever  • Negative: Patient sounds very sick or weak to the triager  • Negative: SEVERE sinus pain  • Negative: Severe headache  • Negative: Redness or swelling on the cheek, forehead, or around the eye  • Negative: Fever > 103 F (39.4 C)  • Negative: Fever > 101 F (38.3 C) and over 60 years of age  • Negative: Fever > 100.0 F (37.8 C) and has diabetes mellitus or a weak immune system (e.g., HIV positive, cancer chemotherapy, organ transplant, splenectomy, chronic steroids)  • Negative: Fever > 100.0 F (37.8 C) and bedridden (e.g., nursing home patient, stroke, chronic illness, recovering from surgery)  • Negative: Fever present > 3 days (72 hours)  • Negative: Fever returns after gone for over 24 hours and symptoms worse or not improved  • Negative: Sinus pain (not just congestion) and fever  • Negative: Earache    Answer Assessment - Initial Assessment Questions  1. LOCATION: \"Where does it hurt?\"       HA @ times, mainly nasal congestion  2. ONSET: \"When did the sinus pain start?\"  (e.g., hours, days)       Congestion started about 2 months ago  3. SEVERITY: \"How bad is the pain?\"   (Scale 1-10; mild, moderate or severe)    - MILD (1-3): doesn't interfere with normal activities     - MODERATE (4-7): interferes with normal activities (e.g., work or school) or awakens from sleep    - SEVERE (8-10): excruciating pain and patient unable to do any normal activities         Congestion is really bad, severe, sometimes unable to breath through nose  4. RECURRENT SYMPTOM: \"Have you ever had sinus problems before?\" If so, ask: \"When was the last time?\" and \"What happened that time?\"       No, allergies since moving to [place], moved " "in 2013, this year has been really bad  5. NASAL CONGESTION: \"Is the nose blocked?\" If so, ask, \"Can you open it or must you breathe through the mouth?\"      Not blocked @ the moment but was earlier today  6. NASAL DISCHARGE: \"Do you have discharge from your nose?\" If so ask, \"What color?\"      Water drips out of nose, clear  7. FEVER: \"Do you have a fever?\" If so, ask: \"What is it, how was it measured, and when did it start?\"       no  8. OTHER SYMPTOMS: \"Do you have any other symptoms?\" (e.g., sore throat, cough, earache, difficulty breathing)      Sometimes right ear hurts  9. PREGNANCY: \"Is there any chance you are pregnant?\" \"When was your last menstrual period?\"      No, last month    Protocols used: SINUS PAIN AND CONGESTION-A-OH      "

## 2021-03-11 ENCOUNTER — OFFICE VISIT (OUTPATIENT)
Dept: URGENT CARE | Facility: PHYSICIAN GROUP | Age: 31
End: 2021-03-11
Payer: COMMERCIAL

## 2021-03-11 VITALS
HEIGHT: 66 IN | OXYGEN SATURATION: 97 % | BODY MASS INDEX: 32.14 KG/M2 | TEMPERATURE: 97.1 F | DIASTOLIC BLOOD PRESSURE: 62 MMHG | HEART RATE: 72 BPM | WEIGHT: 200 LBS | SYSTOLIC BLOOD PRESSURE: 128 MMHG | RESPIRATION RATE: 17 BRPM

## 2021-03-11 DIAGNOSIS — J32.0 CHRONIC MAXILLARY SINUSITIS: Primary | ICD-10-CM

## 2021-03-11 PROCEDURE — 99213 OFFICE O/P EST LOW 20 MIN: CPT | Performed by: PHYSICIAN ASSISTANT

## 2021-03-11 RX ORDER — CETIRIZINE HYDROCHLORIDE 10 MG/1
10 TABLET ORAL DAILY
Status: ON HOLD | COMMUNITY
End: 2021-12-16

## 2021-03-11 RX ORDER — METHYLPREDNISOLONE 4 MG/1
4 TABLET ORAL DAILY
Qty: 21 EACH | Refills: 0 | Status: SHIPPED | OUTPATIENT
Start: 2021-03-11 | End: 2021-03-17

## 2021-03-11 RX ORDER — FLUTICASONE PROPIONATE 50 MCG
1 SPRAY, SUSPENSION (ML) NASAL DAILY
Qty: 16 G | Refills: 3 | Status: SHIPPED | OUTPATIENT
Start: 2021-03-11 | End: 2021-03-18

## 2021-03-11 ASSESSMENT — ENCOUNTER SYMPTOMS
CONSTIPATION: 0
SORE THROAT: 0
COUGH: 0
SINUS PRESSURE: 1
SINUS PAIN: 1
ABDOMINAL PAIN: 0
SHORTNESS OF BREATH: 0
VOMITING: 0
DIARRHEA: 0
NAUSEA: 0
FEVER: 0
CHILLS: 0

## 2021-03-11 ASSESSMENT — FIBROSIS 4 INDEX: FIB4 SCORE: 0.44

## 2021-03-11 NOTE — PROGRESS NOTES
Subjective:   Renetta Nichols is a 31 y.o. female who presents for Nasal Congestion (x 2-3 months)        Sinusitis  This is a new problem. Episode onset: 2 months  The problem has been waxing and waning since onset. There has been no fever. Associated symptoms include congestion, ear pain, sinus pressure and sneezing. Pertinent negatives include no chills, coughing, shortness of breath or sore throat. Treatments tried: zyrtec      Hx of chronic seasonal allergies. D/c allegra causes nose bleeds.     Review of Systems   Constitutional: Negative for chills, fever and malaise/fatigue.   HENT: Positive for congestion, ear pain, sinus pressure, sinus pain and sneezing. Negative for sore throat.    Respiratory: Negative for cough and shortness of breath.    Gastrointestinal: Negative for abdominal pain, constipation, diarrhea, nausea and vomiting.   All other systems reviewed and are negative.      PMH:  has a past medical history of Pregnant. She also has no past medical history of Asthma, Psychiatric problem, or Tuberculosis.  MEDS:   Current Outpatient Medications:   •  cetirizine (ZYRTEC) 10 MG Tab, Take 10 mg by mouth every day., Disp: , Rfl:   •  fluticasone (FLONASE) 50 MCG/ACT nasal spray, Administer 1 Spray into affected nostril(S) every day for 7 days., Disp: 16 g, Rfl: 3  •  methylPREDNISolone (MEDROL DOSEPAK) 4 MG Tablet Therapy Pack, Take 1 tablet by mouth every day for 6 days. Take as tapered-dosage schedule, Disp: 21 Each, Rfl: 0  ALLERGIES: No Known Allergies  SURGHX: History reviewed. No pertinent surgical history.  SOCHX:  reports that she has never smoked. She has never used smokeless tobacco. She reports current alcohol use. She reports that she does not use drugs.  Family History   Problem Relation Age of Onset   • No Known Problems Mother    • No Known Problems Father    • No Known Problems Sister    • No Known Problems Brother    • Diabetes Paternal Aunt         Objective:   /62   Pulse 72    "Temp 36.2 °C (97.1 °F)   Resp 17   Ht 1.676 m (5' 6\")   Wt 90.7 kg (200 lb)   SpO2 97%   BMI 32.28 kg/m²     Physical Exam  Vitals reviewed.   Constitutional:       General: She is not in acute distress.     Appearance: She is well-developed.   HENT:      Head: Normocephalic and atraumatic.      Right Ear: External ear normal. Tympanic membrane is erythematous.      Left Ear: External ear normal. Tympanic membrane is erythematous.      Nose: Congestion and rhinorrhea present.      Mouth/Throat:      Lips: Pink.      Mouth: Mucous membranes are moist.      Pharynx: Oropharynx is clear. Uvula midline.      Tonsils: No tonsillar exudate.   Eyes:      Conjunctiva/sclera: Conjunctivae normal.      Pupils: Pupils are equal, round, and reactive to light.   Neck:      Trachea: No tracheal deviation.   Cardiovascular:      Rate and Rhythm: Normal rate and regular rhythm.   Pulmonary:      Effort: Pulmonary effort is normal. No respiratory distress.      Breath sounds: Normal breath sounds. No wheezing.   Musculoskeletal:      Cervical back: Normal range of motion and neck supple.   Skin:     General: Skin is warm and dry.      Capillary Refill: Capillary refill takes less than 2 seconds.   Neurological:      General: No focal deficit present.      Mental Status: She is alert and oriented to person, place, and time.   Psychiatric:         Mood and Affect: Mood normal.         Behavior: Behavior normal.           Assessment/Plan:     1. Chronic maxillary sinusitis  REFERRAL TO ENT    fluticasone (FLONASE) 50 MCG/ACT nasal spray    methylPREDNISolone (MEDROL DOSEPAK) 4 MG Tablet Therapy Pack     Supportive care reviewed.  Continue antihistamine, start Flonase, saline rinse.  Referral was placed to follow-up with ear nose and throat.    If symptoms worsen or persist patient can return to clinic for reevaluation. All side effects of medication discussed including allergic response, GI upset, tendon injury, etc. Patient " confirmed understanding of information.    Please note that this dictation was created using voice recognition software. I have made every reasonable attempt to correct obvious errors, but I expect that there are errors of grammar and possibly content that I did not discover before finalizing the note.

## 2021-04-06 ENCOUNTER — NURSE TRIAGE (OUTPATIENT)
Dept: HEALTH INFORMATION MANAGEMENT | Facility: OTHER | Age: 31
End: 2021-04-06

## 2021-04-26 ENCOUNTER — OFFICE VISIT (OUTPATIENT)
Dept: MEDICAL GROUP | Facility: PHYSICIAN GROUP | Age: 31
End: 2021-04-26
Payer: COMMERCIAL

## 2021-04-26 VITALS
SYSTOLIC BLOOD PRESSURE: 108 MMHG | TEMPERATURE: 97.6 F | OXYGEN SATURATION: 98 % | WEIGHT: 208 LBS | RESPIRATION RATE: 14 BRPM | BODY MASS INDEX: 35.51 KG/M2 | DIASTOLIC BLOOD PRESSURE: 66 MMHG | HEART RATE: 71 BPM | HEIGHT: 64 IN

## 2021-04-26 DIAGNOSIS — R63.0 LOSS OF APPETITE: ICD-10-CM

## 2021-04-26 DIAGNOSIS — N92.6 IRREGULAR MENSES: ICD-10-CM

## 2021-04-26 DIAGNOSIS — Z13.228 SCREENING FOR ENDOCRINE, METABOLIC AND IMMUNITY DISORDER: ICD-10-CM

## 2021-04-26 DIAGNOSIS — Z13.29 SCREENING FOR ENDOCRINE, METABOLIC AND IMMUNITY DISORDER: ICD-10-CM

## 2021-04-26 DIAGNOSIS — Z13.0 SCREENING FOR ENDOCRINE, METABOLIC AND IMMUNITY DISORDER: ICD-10-CM

## 2021-04-26 LAB
INT CON NEG: NORMAL
INT CON POS: NORMAL
POC URINE PREGNANCY TEST: NORMAL

## 2021-04-26 PROCEDURE — 99214 OFFICE O/P EST MOD 30 MIN: CPT | Performed by: PHYSICIAN ASSISTANT

## 2021-04-26 PROCEDURE — 81025 URINE PREGNANCY TEST: CPT | Performed by: PHYSICIAN ASSISTANT

## 2021-04-26 RX ORDER — AZELASTINE 1 MG/ML
SPRAY, METERED NASAL
Status: ON HOLD | COMMUNITY
Start: 2021-04-19 | End: 2021-12-16

## 2021-04-26 ASSESSMENT — FIBROSIS 4 INDEX: FIB4 SCORE: 0.44

## 2021-04-26 ASSESSMENT — PATIENT HEALTH QUESTIONNAIRE - PHQ9: CLINICAL INTERPRETATION OF PHQ2 SCORE: 0

## 2021-04-26 NOTE — ASSESSMENT & PLAN NOTE
Patient reports that she think she may be pregnant. She has two sons and has been trying to get pregnant. Her menstrual cycle was mid march but don't know the exact day.

## 2021-04-26 NOTE — PROGRESS NOTES
"Subjective:     CC: establish care, loss of appetite    HPI:   Renetta presents today with     Loss of appetite  Patient reports loss of appetite for the last few weeks    Irregular menses  Patient reports that she think she may be pregnant. She has two sons and has been trying to get pregnant. Her menstrual cycle was mid march but don't know the exact day.       Past Medical History:   Diagnosis Date   • Pregnant        Social History     Tobacco Use   • Smoking status: Never Smoker   • Smokeless tobacco: Never Used   Substance Use Topics   • Alcohol use: Not Currently   • Drug use: No       Current Outpatient Medications Ordered in Epic   Medication Sig Dispense Refill   • azelastine (ASTELIN) 137 MCG/SPRAY nasal spray      • cetirizine (ZYRTEC) 10 MG Tab Take 10 mg by mouth every day.       No current [facility]-ordered facility-administered medications on file.       Allergies:  Patient has no known allergies.    Health Maintenance: Completed.  Patient will get Pap smear done with her gynecologist    ROS:  Gen: no fevers/chills, positive for loss of appetite  Eyes: no changes in vision  ENT: no sore throat  Pulm: no sob, no cough  CV: no chest pain  GI: no nausea/vomiting  : no dysuria, positive for irregular menses  MSk: no myalgias  Skin: no rash  Neuro: no headaches  Psych: no depression, no anxiety      Objective:     Exam:  /66   Pulse 71   Temp 36.4 °C (97.6 °F)   Resp 14   Ht 1.626 m (5' 4\")   Wt 94.3 kg (208 lb)   LMP 03/16/2021   SpO2 98%   BMI 35.70 kg/m²  Body mass index is 35.7 kg/m².    Gen: Alert and oriented, No apparent distress.  Skin: Warm, dry, good turgor, no rashes in visible areas.  HEENT: Normocephalic. Eyes conjunctiva clear lids without ptosis, pupils equal and reactive to light accommodation, ears normal shape and contour, canals are clear bilaterally, tympanic membranes are benign, nasal mucosa benign, oropharynx is without erythema, edema or exudates.   Neck: Trachea midline, " no masses, no thyromegaly  Lungs: Normal effort, CTA bilaterally, no wheezes, rhonchi, or rales  CV: Regular rate and rhythm. No murmurs, rubs, or gallops.  MSK: Normal gait, moves all extremities.  Neuro: Grossly non-focal.  Ext: No clubbing, cyanosis, edema.  Psych: Alert and oriented x3, normal affect and mood.     Assessment & Plan:     31 y.o. female with the following -     1. Irregular menses  Patient's POCT pregnancy is positive which she was notified of.  Patient reports that she is very excited to be pregnant and that this was a planned pregnancy.  Patient is not on any medications.  She is currently not taking a prenatal vitamin which I encouraged her to start today.  Also encouraged her to drink plenty of fluids.  She was previously established with an OB when her son was born in 2018.  Instructed the patient to call her OB today to try to make an appointment as I would like her to be seen around 8 weeks pregnant I suspect she is around 6 weeks.  Patient will send me a message today if she is unable to get an appointment with her OB so we can put in a referral for her to reestablish with a new OB.  I did not order blood work today as patient is going to have blood work drawn at her first prenatal appointment.   - POCT Pregnancy    2. Loss of appetite  Chronic.  Suspect symptom of pregnancy which the patient also thinks.     I spent a total of 30 minutes with record review (including external notes and labs), exam, communication with the patient, communication with other providers, and documentation of this encounter.     Return in about 1 year (around 4/26/2022) for annual wellness or sooner if needed.    Please note that this dictation was created using voice recognition software. I have made every reasonable attempt to correct obvious errors, but I expect that there are errors of grammar and possibly content that I did not discover before finalizing the note.    Electronically signed by Glendy Esquivel  CHUCKIE on April 26, 2021

## 2021-05-18 LAB
ABO GROUP BLD: NORMAL
HBV SURFACE AG SERPL QL IA: NORMAL
HIV 1+2 AB+HIV1 P24 AG SERPL QL IA: NORMAL
RH BLD: NORMAL
RUBV IGG SERPL IA-ACNC: NORMAL
TREPONEMA PALLIDUM IGG+IGM AB [PRESENCE] IN SERUM OR PLASMA BY IMMUNOASSAY: NON REACTIVE

## 2021-08-09 NOTE — PROGRESS NOTES
General: Subjective:     CC:   Chief Complaint   Patient presents with   • Annual Exam       HPI:   Renetta Nichols is a 29 y.o. female who presents for annual exam. She is feeling well and denies any complaints.    Ob-Gyn/ History:    Patient has GYN provider: no  /Para:  2/2  Last Pap Smear:  2018. Records requested.  No  history of abnormal pap smears.  Gyn Surgery: Laine.  Current Contraceptive Method:  None  She is currently sexually active. Does not believe in birth control  Last menstrual period: .  Periods regular. light bleeding. Cramping is mild.   She does not take OTC analgesics for cramps.  No significant bloating/fluid retention, pelvic pain, or dyspareunia. No vaginal discharge    Health Maintenance    Cholesterol Screening: Lipid panel ordered today  Diabetes Screening: Fasting blood sugar  Aspirin Use: Not indicated  Diet: Healthy diet.  Patient has lost 20 pounds recently and is participating in an exercise challenge working out regularly at the gym.  Substance Abuse: Denies  Safe in relationship.  Yes   Seat belts, bike helmet, gun safety discussed.  Sun protection used.    Cancer screening  Colorectal Cancer Screening: Due at age 50  Lung Cancer Screening: Not indicated  Cervical Cancer Screenin records requested from [facility].  She would like to transition here for her future Paps.  Breast Cancer Screening: Due at age 40    Infectious disease screening/Immunizations  --STI Screening: Declines  --Practices safe sex.  --HIV Screening: Declines  --Hepatitis C Screening: Not indicated  --Immunizations:    Influenza: Annually   Tetanus: 2016 up-to-date         She  has a past medical history of Pregnant. She also has no past medical history of Asthma, Psychiatric problem, or Tuberculosis.  She  has no past surgical history on file.    No family history on file.    Social History     Socioeconomic History   • Marital status:      Spouse name: Not on file   • Number of  children: Not on file   • Years of education: Not on file   • Highest education level: Not on file   Occupational History   • Not on file   Social Needs   • Financial resource strain: Not on file   • Food insecurity:     Worry: Not on file     Inability: Not on file   • Transportation needs:     Medical: Not on file     Non-medical: Not on file   Tobacco Use   • Smoking status: Never Smoker   • Smokeless tobacco: Never Used   Substance and Sexual Activity   • Alcohol use: No   • Drug use: No   • Sexual activity: Not on file   Lifestyle   • Physical activity:     Days per week: Not on file     Minutes per session: Not on file   • Stress: Not on file   Relationships   • Social connections:     Talks on phone: Not on file     Gets together: Not on file     Attends Gnosticism service: Not on file     Active member of club or organization: Not on file     Attends meetings of clubs or organizations: Not on file     Relationship status: Not on file   • Intimate partner violence:     Fear of current or ex partner: Not on file     Emotionally abused: Not on file     Physically abused: Not on file     Forced sexual activity: Not on file   Other Topics Concern   • Not on file   Social History Narrative   • Not on file       There are no active problems to display for this patient.        Current Outpatient Medications   Medication Sig Dispense Refill   • ibuprofen (MOTRIN) 600 MG Tab Take 1 Tab by mouth every 6 hours as needed. (Patient not taking: Reported on 9/9/2019) 30 Tab 1   • docusate sodium 100 MG Cap Take 100 mg by mouth 2 times a day as needed for Constipation. (Patient not taking: Reported on 9/9/2019) 60 Cap 1   • ferrous gluconate (FERGON) 324 (38 Fe) MG Tab Take 1 Tab by mouth every morning with breakfast. (Patient not taking: Reported on 9/9/2019) 30 Tab 0     No current facility-administered medications for this visit.      No Known Allergies    Review of Systems   Constitutional: Negative for fever, chills and  "malaise/fatigue.   HENT: Negative for congestion.    Eyes: Negative for pain.   Respiratory: Negative for cough and shortness of breath.    Cardiovascular: Negative for leg swelling.   Gastrointestinal: Negative for nausea, vomiting, abdominal pain and diarrhea.   Genitourinary: Negative for dysuria and hematuria.   Skin: Negative for rash.   Neurological: Negative for dizziness, focal weakness and headaches.   Endo/Heme/Allergies: Does not bruise/bleed easily.   Psychiatric/Behavioral: Negative for depression.  The patient is not nervous/anxious.      Objective:     /64 (BP Location: Right arm, Patient Position: Sitting, BP Cuff Size: Adult)   Pulse 64   Temp 36.8 °C (98.2 °F) (Temporal)   Resp 18   Ht 1.62 m (5' 3.78\")   Wt 82.9 kg (182 lb 12.8 oz)   LMP 09/09/2019 (Exact Date)   SpO2 98%   Breastfeeding? No   BMI 31.59 kg/m²   Body mass index is 31.59 kg/m².  Wt Readings from Last 4 Encounters:   03/29/18 96.2 kg (212 lb)   06/21/16 98 kg (216 lb)       Physical Exam:  Constitutional: Well-developed and well-nourished. Not diaphoretic. No distress.   Skin: Skin is warm and dry. No rash noted.  Head: Atraumatic without lesions.  Eyes: Conjunctivae and extraocular motions are normal. Pupils are equal, round, and reactive to light. No scleral icterus.   Ears:  External ears unremarkable. Tympanic membranes clear and intact.  Nose: Nares patent. Septum midline. Turbinates without erythema nor edema. No discharge.   Mouth/Throat:Tongue normal. Oropharynx is clear and moist. Posterior pharynx without erythema or exudates.  Neck: Supple, trachea midline. Normal range of motion. No thyromegaly present. No lymphadenopathy--cervical or supraclavicular.  Cardiovascular: Regular rate and rhythm, S1 and S2 without murmur, rubs, or gallops.  Lungs: Normal inspiratory effort, CTA bilaterally, no wheezes/rhonchi/rales  Abdomen: Soft, non tender, and without distention. Active bowel sounds in all four quadrants. No " rebound, guarding, masses or HSM.  Extremities: No cyanosis, clubbing, erythema, nor edema. Distal pulses intact and symmetric.   Musculoskeletal: All major joints AROM full in all directions without pain.  Neurological: Alert and oriented x 3. Sensation intact. Negative Rhomberg.  Psychiatric:  Behavior, mood, and affect are appropriate.      Assessment and Plan:     1. Well adult exam  CBC WITH DIFFERENTIAL    Comp Metabolic Panel    Lipid Profile    VITAMIN D,25 HYDROXY    TSH WITH REFLEX TO FT4    FERRITIN    Patient identified as having weight management issue.  Appropriate orders and counseling given.   2. Obesity (BMI 30-39.9)  Patient identified as having weight management issue.  Appropriate orders and counseling given.       HCM: Reviewed with patient and up-to-date.  Pap records request  Flu shot not available  Labs per orders  Immunizations per orders  Patient counseled about skin care, diet, supplements, prenatal vitamins, safe sex and exercise.    Follow-up: Return in about 1 year (around 9/9/2020) for Preventative/Annual physical.

## 2021-11-16 ENCOUNTER — HOSPITAL ENCOUNTER (OUTPATIENT)
Facility: MEDICAL CENTER | Age: 31
End: 2021-11-16
Attending: OBSTETRICS & GYNECOLOGY
Payer: COMMERCIAL

## 2021-11-16 ENCOUNTER — ROUTINE PRENATAL (OUTPATIENT)
Dept: OBGYN | Facility: CLINIC | Age: 31
End: 2021-11-16
Payer: COMMERCIAL

## 2021-11-16 VITALS — SYSTOLIC BLOOD PRESSURE: 130 MMHG | DIASTOLIC BLOOD PRESSURE: 72 MMHG

## 2021-11-16 DIAGNOSIS — Z34.83 SUPERVISION OF NORMAL INTRAUTERINE PREGNANCY IN MULTIGRAVIDA IN THIRD TRIMESTER: ICD-10-CM

## 2021-11-16 LAB
APPEARANCE UR: CLEAR
BILIRUB UR STRIP-MCNC: NORMAL MG/DL
COLOR UR AUTO: YELLOW
GLUCOSE UR STRIP.AUTO-MCNC: NORMAL MG/DL
KETONES UR STRIP.AUTO-MCNC: NORMAL MG/DL
LEUKOCYTE ESTERASE UR QL STRIP.AUTO: NORMAL
NITRITE UR QL STRIP.AUTO: NORMAL
PH UR STRIP.AUTO: 6.5 [PH] (ref 5–8)
PROT UR QL STRIP: NORMAL MG/DL
RBC UR QL AUTO: NORMAL
SP GR UR STRIP.AUTO: 1.01
UROBILINOGEN UR STRIP-MCNC: NORMAL MG/DL

## 2021-11-16 PROCEDURE — 81002 URINALYSIS NONAUTO W/O SCOPE: CPT | Performed by: OBSTETRICS & GYNECOLOGY

## 2021-11-16 PROCEDURE — 90040 PR PRENATAL FOLLOW UP: CPT | Performed by: OBSTETRICS & GYNECOLOGY

## 2021-11-16 PROCEDURE — 87150 DNA/RNA AMPLIFIED PROBE: CPT

## 2021-11-16 PROCEDURE — 87081 CULTURE SCREEN ONLY: CPT

## 2021-11-16 NOTE — PROGRESS NOTES
Pt without complaints. Reports active FM and KCs re-instructed and encouraged. Denies CTXs, vb, or LOF. Denies S&S of PIH. GBS done. Cx 1/thick/vertex high. Labor prec. Hospital reg/peds. Discussed. ?S answered.     OB ISSUES:    31 y.o. female  with EDC 21 By LMP and 9 week US    FOB: Walker    PNLS: Rh+; immune, neg, neg, neg  Aneuploidy screening: tetra low risk  Glucola: elevated- 3 GTT NORMAL    [ ] - TdAP     [X] - GBS done

## 2021-11-17 LAB — GP B STREP DNA SPEC QL NAA+PROBE: NEGATIVE

## 2021-12-01 ENCOUNTER — ROUTINE PRENATAL (OUTPATIENT)
Dept: OBGYN | Facility: CLINIC | Age: 31
End: 2021-12-01
Payer: COMMERCIAL

## 2021-12-01 VITALS — BODY MASS INDEX: 38.28 KG/M2 | WEIGHT: 223 LBS | SYSTOLIC BLOOD PRESSURE: 140 MMHG | DIASTOLIC BLOOD PRESSURE: 66 MMHG

## 2021-12-01 DIAGNOSIS — Z34.83 SUPERVISION OF NORMAL INTRAUTERINE PREGNANCY IN MULTIGRAVIDA IN THIRD TRIMESTER: ICD-10-CM

## 2021-12-01 PROCEDURE — 81002 URINALYSIS NONAUTO W/O SCOPE: CPT | Performed by: OBSTETRICS & GYNECOLOGY

## 2021-12-01 PROCEDURE — 90040 PR PRENATAL FOLLOW UP: CPT | Performed by: OBSTETRICS & GYNECOLOGY

## 2021-12-01 NOTE — PROGRESS NOTES
Doing well. No complaints. Reports active FM. Doing KCs. Denies KCs. Vb, or LOF. Denies S&S of PIH. Reviewed negative GBS. Pt defers Cx exam today. Routine prec. ?S answered. Continue KCs.     OB ISSUES:    31 y.o. female  with EDC 21 By LMP and 9 week US    FOB: Walker    PNLS: Rh+; immune, neg, neg, neg  Aneuploidy screening: tetra low risk  Glucola: elevated- 3 GTT NORMAL    [ ] - TdAP     [X] - GBS done - NEGATIVE    [facility]/ peds- Dr Riggs

## 2021-12-07 ENCOUNTER — ROUTINE PRENATAL (OUTPATIENT)
Dept: OBGYN | Facility: CLINIC | Age: 31
End: 2021-12-07
Payer: COMMERCIAL

## 2021-12-07 VITALS — BODY MASS INDEX: 39.31 KG/M2 | WEIGHT: 229 LBS | DIASTOLIC BLOOD PRESSURE: 76 MMHG | SYSTOLIC BLOOD PRESSURE: 124 MMHG

## 2021-12-07 DIAGNOSIS — Z34.83 SUPERVISION OF NORMAL INTRAUTERINE PREGNANCY IN MULTIGRAVIDA IN THIRD TRIMESTER: ICD-10-CM

## 2021-12-07 PROCEDURE — 90715 TDAP VACCINE 7 YRS/> IM: CPT | Performed by: OBSTETRICS & GYNECOLOGY

## 2021-12-07 PROCEDURE — 90040 PR PRENATAL FOLLOW UP: CPT | Performed by: OBSTETRICS & GYNECOLOGY

## 2021-12-07 PROCEDURE — 90471 IMMUNIZATION ADMIN: CPT | Performed by: OBSTETRICS & GYNECOLOGY

## 2021-12-07 NOTE — PROGRESS NOTES
Pt doing well. No complaints. Feels better overall after stopping work last week. Reports active FM and denies CTXs, vb, or LOF. Denies S&S of PIH. Defers cervical exam today. Desires TdAP today  and will be given. All ?S answered. continue KCs.     OB ISSUES:    31 y.o. female  with EDC 21 By LMP and 9 week US    FOB: Walker    PNLS: Rh+; immune, neg, neg, neg  Aneuploidy screening: tetra low risk  Glucola: elevated- 3 GTT NORMAL    [X] - TdAP given 21    [X] - GBS done - NEGATIVE    [facility]iver [facility]/ peds- Dr Riggs

## 2021-12-07 NOTE — NON-PROVIDER
Pt here today for OB follow up  Pt states no complaints   Reports +  Good # [phone]   Pharmacy Confirmed.   Pt not taking prenatal   Tdap received

## 2021-12-13 ENCOUNTER — ROUTINE PRENATAL (OUTPATIENT)
Dept: OBGYN | Facility: CLINIC | Age: 31
End: 2021-12-13
Payer: COMMERCIAL

## 2021-12-13 VITALS — DIASTOLIC BLOOD PRESSURE: 78 MMHG | BODY MASS INDEX: 38.11 KG/M2 | SYSTOLIC BLOOD PRESSURE: 128 MMHG | WEIGHT: 222 LBS

## 2021-12-13 DIAGNOSIS — Z34.83 SUPERVISION OF NORMAL INTRAUTERINE PREGNANCY IN MULTIGRAVIDA IN THIRD TRIMESTER: ICD-10-CM

## 2021-12-13 PROCEDURE — 81002 URINALYSIS NONAUTO W/O SCOPE: CPT | Performed by: OBSTETRICS & GYNECOLOGY

## 2021-12-13 PROCEDURE — 90040 PR PRENATAL FOLLOW UP: CPT | Performed by: OBSTETRICS & GYNECOLOGY

## 2021-12-13 NOTE — PROGRESS NOTES
Doing well. No complaints. Reports active FM and is doing KCs. Denies CTXs, vb, or LOF. Denies S&S of PIH. Cx 2-3/-2. She desires expectant mangment at this time and will follow up in one week. Routine prec. Continue KCs. ?S answered.     OB ISSUES:    31 y.o. female  with EDC 21 By LMP and 9 week US    FOB: Walker    PNLS: Rh+; immune, neg, neg, neg  Aneuploidy screening: tetra low risk  Glucola: elevated- 3 GTT NORMAL    [X] - TdAP given 21    [X] - GBS done - NEGATIVE    Deliver @ [facility]/ peds- Dr Riggs

## 2021-12-16 ENCOUNTER — HOSPITAL ENCOUNTER (INPATIENT)
Facility: MEDICAL CENTER | Age: 31
LOS: 1 days | End: 2021-12-17
Attending: OBSTETRICS & GYNECOLOGY | Admitting: OBSTETRICS & GYNECOLOGY
Payer: COMMERCIAL

## 2021-12-16 LAB
BASOPHILS # BLD AUTO: 0.3 % (ref 0–1.8)
BASOPHILS # BLD: 0.03 K/UL (ref 0–0.12)
EOSINOPHIL # BLD AUTO: 0.12 K/UL (ref 0–0.51)
EOSINOPHIL NFR BLD: 1.1 % (ref 0–6.9)
ERYTHROCYTE [DISTWIDTH] IN BLOOD BY AUTOMATED COUNT: 41.2 FL (ref 35.9–50)
HCT VFR BLD AUTO: 33.1 % (ref 37–47)
HGB BLD-MCNC: 10.6 G/DL (ref 12–16)
HOLDING TUBE BB 8507: NORMAL
IMM GRANULOCYTES # BLD AUTO: 0.06 K/UL (ref 0–0.11)
IMM GRANULOCYTES NFR BLD AUTO: 0.5 % (ref 0–0.9)
LYMPHOCYTES # BLD AUTO: 2.12 K/UL (ref 1–4.8)
LYMPHOCYTES NFR BLD: 19 % (ref 22–41)
MCH RBC QN AUTO: 24.5 PG (ref 27–33)
MCHC RBC AUTO-ENTMCNC: 32 G/DL (ref 33.6–35)
MCV RBC AUTO: 76.6 FL (ref 81.4–97.8)
MONOCYTES # BLD AUTO: 0.56 K/UL (ref 0–0.85)
MONOCYTES NFR BLD AUTO: 5 % (ref 0–13.4)
NEUTROPHILS # BLD AUTO: 8.26 K/UL (ref 2–7.15)
NEUTROPHILS NFR BLD: 74.1 % (ref 44–72)
NRBC # BLD AUTO: 0 K/UL
NRBC BLD-RTO: 0 /100 WBC
PLATELET # BLD AUTO: 247 K/UL (ref 164–446)
PMV BLD AUTO: 11.8 FL (ref 9–12.9)
RBC # BLD AUTO: 4.32 M/UL (ref 4.2–5.4)
SARS-COV+SARS-COV-2 AG RESP QL IA.RAPID: NOTDETECTED
SPECIMEN SOURCE: NORMAL
WBC # BLD AUTO: 11.2 K/UL (ref 4.8–10.8)

## 2021-12-16 PROCEDURE — 700102 HCHG RX REV CODE 250 W/ 637 OVERRIDE(OP): Performed by: OBSTETRICS & GYNECOLOGY

## 2021-12-16 PROCEDURE — 700105 HCHG RX REV CODE 258: Performed by: OBSTETRICS & GYNECOLOGY

## 2021-12-16 PROCEDURE — 87426 SARSCOV CORONAVIRUS AG IA: CPT

## 2021-12-16 PROCEDURE — 59409 OBSTETRICAL CARE: CPT

## 2021-12-16 PROCEDURE — A9270 NON-COVERED ITEM OR SERVICE: HCPCS | Performed by: OBSTETRICS & GYNECOLOGY

## 2021-12-16 PROCEDURE — 304965 HCHG RECOVERY SERVICES

## 2021-12-16 PROCEDURE — 4A1HXCZ MONITORING OF PRODUCTS OF CONCEPTION, CARDIAC RATE, EXTERNAL APPROACH: ICD-10-PCS | Performed by: OBSTETRICS & GYNECOLOGY

## 2021-12-16 PROCEDURE — 36415 COLL VENOUS BLD VENIPUNCTURE: CPT

## 2021-12-16 PROCEDURE — 59400 OBSTETRICAL CARE: CPT | Performed by: OBSTETRICS & GYNECOLOGY

## 2021-12-16 PROCEDURE — 10907ZC DRAINAGE OF AMNIOTIC FLUID, THERAPEUTIC FROM PRODUCTS OF CONCEPTION, VIA NATURAL OR ARTIFICIAL OPENING: ICD-10-PCS | Performed by: OBSTETRICS & GYNECOLOGY

## 2021-12-16 PROCEDURE — 700111 HCHG RX REV CODE 636 W/ 250 OVERRIDE (IP)

## 2021-12-16 PROCEDURE — 700111 HCHG RX REV CODE 636 W/ 250 OVERRIDE (IP): Performed by: OBSTETRICS & GYNECOLOGY

## 2021-12-16 PROCEDURE — 302449 STATCHG TRIAGE ONLY (STATISTIC)

## 2021-12-16 PROCEDURE — 59025 FETAL NON-STRESS TEST: CPT

## 2021-12-16 PROCEDURE — 770002 HCHG ROOM/CARE - OB PRIVATE (112)

## 2021-12-16 PROCEDURE — 85025 COMPLETE CBC W/AUTO DIFF WBC: CPT

## 2021-12-16 RX ORDER — DOCUSATE SODIUM 100 MG/1
100 CAPSULE, LIQUID FILLED ORAL 2 TIMES DAILY PRN
Status: DISCONTINUED | OUTPATIENT
Start: 2021-12-16 | End: 2021-12-17 | Stop reason: HOSPADM

## 2021-12-16 RX ORDER — IBUPROFEN 600 MG/1
600 TABLET ORAL EVERY 6 HOURS PRN
Status: DISCONTINUED | OUTPATIENT
Start: 2021-12-16 | End: 2021-12-16

## 2021-12-16 RX ORDER — ONDANSETRON 4 MG/1
4 TABLET, ORALLY DISINTEGRATING ORAL EVERY 6 HOURS PRN
Status: DISCONTINUED | OUTPATIENT
Start: 2021-12-16 | End: 2021-12-17 | Stop reason: HOSPADM

## 2021-12-16 RX ORDER — MISOPROSTOL 200 UG/1
800 TABLET ORAL
Status: DISCONTINUED | OUTPATIENT
Start: 2021-12-16 | End: 2021-12-17 | Stop reason: HOSPADM

## 2021-12-16 RX ORDER — MISOPROSTOL 200 UG/1
800 TABLET ORAL
Status: DISCONTINUED | OUTPATIENT
Start: 2021-12-16 | End: 2021-12-16 | Stop reason: HOSPADM

## 2021-12-16 RX ORDER — IBUPROFEN 600 MG/1
600 TABLET ORAL EVERY 6 HOURS PRN
Status: DISCONTINUED | OUTPATIENT
Start: 2021-12-16 | End: 2021-12-17 | Stop reason: HOSPADM

## 2021-12-16 RX ORDER — HYDROXYZINE 50 MG/1
50 TABLET, FILM COATED ORAL EVERY 6 HOURS PRN
Status: DISCONTINUED | OUTPATIENT
Start: 2021-12-16 | End: 2021-12-16 | Stop reason: HOSPADM

## 2021-12-16 RX ORDER — VITAMIN A ACETATE, BETA CAROTENE, ASCORBIC ACID, CHOLECALCIFEROL, .ALPHA.-TOCOPHEROL ACETATE, DL-, THIAMINE MONONITRATE, RIBOFLAVIN, NIACINAMIDE, PYRIDOXINE HYDROCHLORIDE, FOLIC ACID, CYANOCOBALAMIN, CALCIUM CARBONATE, FERROUS FUMARATE, ZINC OXIDE, CUPRIC OXIDE 3080; 12; 120; 400; 1; 1.84; 3; 20; 22; 920; 25; 200; 27; 10; 2 [IU]/1; UG/1; MG/1; [IU]/1; MG/1; MG/1; MG/1; MG/1; MG/1; [IU]/1; MG/1; MG/1; MG/1; MG/1; MG/1
1 TABLET, FILM COATED ORAL
Status: DISCONTINUED | OUTPATIENT
Start: 2021-12-16 | End: 2021-12-17 | Stop reason: HOSPADM

## 2021-12-16 RX ORDER — SODIUM CHLORIDE, SODIUM LACTATE, POTASSIUM CHLORIDE, CALCIUM CHLORIDE 600; 310; 30; 20 MG/100ML; MG/100ML; MG/100ML; MG/100ML
INJECTION, SOLUTION INTRAVENOUS PRN
Status: DISCONTINUED | OUTPATIENT
Start: 2021-12-16 | End: 2021-12-17 | Stop reason: HOSPADM

## 2021-12-16 RX ORDER — SODIUM CHLORIDE, SODIUM LACTATE, POTASSIUM CHLORIDE, CALCIUM CHLORIDE 600; 310; 30; 20 MG/100ML; MG/100ML; MG/100ML; MG/100ML
INJECTION, SOLUTION INTRAVENOUS CONTINUOUS
Status: DISCONTINUED | OUTPATIENT
Start: 2021-12-16 | End: 2021-12-17 | Stop reason: HOSPADM

## 2021-12-16 RX ORDER — ONDANSETRON 2 MG/ML
4 INJECTION INTRAMUSCULAR; INTRAVENOUS EVERY 6 HOURS PRN
Status: DISCONTINUED | OUTPATIENT
Start: 2021-12-16 | End: 2021-12-17 | Stop reason: HOSPADM

## 2021-12-16 RX ORDER — ALUMINA, MAGNESIA, AND SIMETHICONE 2400; 2400; 240 MG/30ML; MG/30ML; MG/30ML
30 SUSPENSION ORAL EVERY 6 HOURS PRN
Status: DISCONTINUED | OUTPATIENT
Start: 2021-12-16 | End: 2021-12-16 | Stop reason: HOSPADM

## 2021-12-16 RX ADMIN — OXYTOCIN 1 MILLI-UNITS/MIN: 10 INJECTION, SOLUTION INTRAMUSCULAR; INTRAVENOUS at 13:55

## 2021-12-16 RX ADMIN — OXYTOCIN 125 ML/HR: 10 INJECTION, SOLUTION INTRAMUSCULAR; INTRAVENOUS at 16:15

## 2021-12-16 RX ADMIN — OXYTOCIN 2000 ML/HR: 10 INJECTION, SOLUTION INTRAMUSCULAR; INTRAVENOUS at 15:05

## 2021-12-16 RX ADMIN — SODIUM CHLORIDE, POTASSIUM CHLORIDE, SODIUM LACTATE AND CALCIUM CHLORIDE: 600; 310; 30; 20 INJECTION, SOLUTION INTRAVENOUS at 13:55

## 2021-12-16 RX ADMIN — PRENATAL WITH FERROUS FUM AND FOLIC ACID 1 TABLET: 3080; 920; 120; 400; 22; 1.84; 3; 20; 10; 1; 12; 200; 27; 25; 2 TABLET ORAL at 19:37

## 2021-12-16 ASSESSMENT — COPD QUESTIONNAIRES
HAVE YOU SMOKED AT LEAST 100 CIGARETTES IN YOUR ENTIRE LIFE: NO/DON'T KNOW
IN THE PAST 12 MONTHS DO YOU DO LESS THAN YOU USED TO BECAUSE OF YOUR BREATHING PROBLEMS: DISAGREE/UNSURE
DO YOU EVER COUGH UP ANY MUCUS OR PHLEGM?: NO/ONLY WITH OCCASIONAL COLDS OR INFECTIONS
COPD SCREENING SCORE: 0
DURING THE PAST 4 WEEKS HOW MUCH DID YOU FEEL SHORT OF BREATH: NONE/LITTLE OF THE TIME

## 2021-12-16 ASSESSMENT — PATIENT HEALTH QUESTIONNAIRE - PHQ9
1. LITTLE INTEREST OR PLEASURE IN DOING THINGS: NOT AT ALL
2. FEELING DOWN, DEPRESSED, IRRITABLE, OR HOPELESS: NOT AT ALL
SUM OF ALL RESPONSES TO PHQ9 QUESTIONS 1 AND 2: 0

## 2021-12-16 ASSESSMENT — LIFESTYLE VARIABLES
EVER_SMOKED: NEVER
ALCOHOL_USE: NO

## 2021-12-16 ASSESSMENT — PAIN DESCRIPTION - PAIN TYPE: TYPE: ACUTE PAIN

## 2021-12-16 NOTE — PROGRESS NOTES
"  Position: KYLEIGH  Placenta: spontaneous, intact, 3VC  Lac: none  EBL: 300cc  Complications: NC X 1 reduced on perineum  Apgars: 8 and 8  Male \"Dveang\"  Dictation #: [id number]    awestfall  "

## 2021-12-16 NOTE — PROGRESS NOTES
EDC - 21 EGA - 39.2    1045 - Report received from Honey SPAULDING Pt moved to 214 for labor. FOB at bedside. POC discussed with pt and family members, all questions answered.   1116 Admission procedures completed  1119 Dr. Zapien at bedside, SVE /-1 AROM thick meconium.  1230 Pt denies needs at this time, coping well with UCs  1255 Dr. Silver at bedside for status update, orders to start pitocin at pts request  1355 Pitocin started, see MAR  1442 Pt feels like the baby is coming, SVE /0 by Jose SPAULDING, pt strongly feels like the baby is coming and she needs to push. Dr. Silver notified of pt status, states she will come now  1454 Dr. Silver at bedside, SVE rim.  Pt repositioned into stirrups, pt educated on proper pushing technique. RN continuously at bedside evaluating FHT and pushing progress.  1500  of viable male infant by Dr. Silver to maternal abdomen. Apgars 8/9    1505 Placenta delivered intact.  No degree laceration at this time.  1520 Fundus firm, lochia Light  1615 Pt ambulated to bathroom in stable condition at this time. Pt able to void. Kinsey pads changed.  1625 Pt transferred to PP in stable condition via wheel chair with infant in arms. Report given to Ct SPAULDING. Infant bands matched x2 cuddles active.

## 2021-12-16 NOTE — PROGRESS NOTES
0916- pt to triage c/o uc since 0130. Pt denies lof or bleeding and states +fm. Will call dr gentile who is oncall for dr vargas with sve    0920- spoke to dr vargas and pt admitted for labor. Charge rn made aware    1045- report given to mike viveros

## 2021-12-16 NOTE — PROGRESS NOTES
"  History and Physical    Renetta Nichols is a 31 y.o. female  -Para:     Gestational Age:  39w2d  Admitted for:   Active Labor  Admitted to  [facility].  Patient received prenatal care: Dr Silver    HPI: Patient is admitted with the above mentioned Chief Complaint and States   Loss of fluid:   negative  Abdominal Pain:  positive  Uterine Contractions:  positive  Vaginal Bleeding:  negative  Fetal Movement:  normal  Patient denies fever, chills, nausea, vomiting , headache, visual disturbance, or dysuria  Patient's last menstrual period was 2021.  Estimated Date of Delivery: 21  Final ANA: 2021, by Last Menstrual Period    Patient Active Problem List    Diagnosis Date Noted   • Indication for care in labor or delivery 2021   • Loss of appetite 2021   • Irregular menses 2021   • Obesity (BMI 30-39.9) 2019       Admitting DX: Indication for care in labor or delivery [O75.9]  Pregnancy Complications:  none  OB Risk Factors:   none  Labor State:    Active phase labor.    History:   has a past medical history of Pregnant. She also has no past medical history of Asthma, Psychiatric problem, or Tuberculosis.     has no past surgical history on file.    OB History    Para Term  AB Living   3 2           SAB IAB Ectopic Molar Multiple Live Births                    # Outcome Date GA Lbr Ashok/2nd Weight Sex Delivery Anes PTL Lv   3 Current            2 Para            1 Para                Medications:  No current facility-administered medications on file prior to encounter.     No current outpatient medications on file prior to encounter.       Allergies:  Patient has no known allergies.    ROS:   Neuro: negative    Cardiovascular: negative  Gastro intestinal: negative  Genitourinary: negative            Physical Exam:  /86   Pulse 84   Temp 36.7 °C (98.1 °F) (Oral)   Ht 1.676 m (5' 6\")   Wt 95.3 kg (210 lb)   LMP 2021   BMI " 33.89 kg/m²   Constitutional: healthy-appearing, Well-developed, well-nourished  HEENT: EOMI  Breast Exam: deferred  Cardio: regular rate and rthym  Lung: unlabored respirations, no intercostal retractions or accessory muscle use  Abdomen: abdomen is soft without significant tenderness, masses, organomegaly or guarding  Extremity: extremities, peripheral pulses and reflexes normal    Cervical Exam: 70%  Cervix Dilatation: 5  Station: negative 2  Pelvis: Adequate  Fetal Assessment: Fetal heart variability: moderate  Fetal Heart Rate decelerations: none  Fetal Heart Rate accelerations: yes  Estimated Fetal Weight: 7 1/2 lbs      Labs:  Recent Labs     12/16/21  1032   WBC 11.2*   RBC 4.32   HEMOGLOBIN 10.6*   HEMATOCRIT 33.1*   MCV 76.6*   MCH 24.5*   MCHC 32.0*   RDW 41.2   PLATELETCT 247   MPV 11.8     Prenatal Results     General (Most Recent Result)     Test Value Date Time    ABO       Rh       Antibody screen       HbA1c       Chlamydia by PCR       Gonorrhea by PCR       RPR/Syphilus       HSV 1/2 by PCR (non-serum)       HSV 1/2 (serum)       HSV 1       HSV 2       HPV (16)       HBsAg       HIV-1 HIV-2 Antibodies       Rubella       Tb             Pap Smear (Most Recent Result)     Test Value Date Time    Pap smear       Pap smear w/HPV       Pap smear w/CTNG       Pap smar w/HPV CTNG       Pap smear (reflex HPV ACUS)       Pap smear (reflex HPV ASCUS w/CTNG)       Pathology gyn specimen             Urinalysis (Most Recent Result)     Test Value Date Time    Urinalysis       POC urinalysis   (See Report)   12/13/21 0840    Urine drug screen (w/ conf)       Urine culture ([id number])   Abnormal    (See Report)   11/06/19 1452    Urine Protein/Creatinine Ratio             Urinalysis, Culture if indicated     Test Value Date Time    Color  Yellow  11/06/19 1452    Appearance  Clear  11/06/19 1452    Specific Gravity  1.026  11/06/19 1452    PH  7.0  11/06/19 1452    Glucose  Negative mg/dL 11/06/19 1452     Ketones  Negative mg/dL 19 1452    Protein  Negative mg/dL 19 1452    Bilirubin  Negative  19 1452    Nitrites  Negative  19 1452    Leukocytes Esterase  Small  19 1452    Blood  Negative  19 1452    Comment  Microscopic  19 1452    Culture             Urine Drug Screen     Test Value Date Time    Amphetamines       Barbiturates       Benzodiazepines       Cocaine       Methadone       Opiates       Oxycodone       Phencylidine       Propoxyphene       Marijuana Metabolite             1st Trimester     Test Value Date Time    Hgb       Hct       Fasting Glucose Tolerance       GTT, 1 hour       GTT, 2 hours       GTT, 3 hours             2nd Trimester     Test Value Date Time    Hgb       Hct       AST       ALT       Uric Acid       Fasting Glucose Tolerance       GTT, 1 hour       GTT, 2 hours       GTT, 3 hours             3rd Trimester     Test Value Date Time    Hgb  10.6 g/dL 21 1032    Hct  33.1 % 21 1032    Platelet count  247 K/uL 21 1032    GBS (MALIK BROTH)  Negative  21 1029    Fasting Glucose Tolerance       GTT, 1 hour       GTT, 2 hous       GTT, 3hours             Congenital Disease Screening     Test Value Date Time    First Trimester Screen       Quad Screen       BH Electrophoresis       Cystic Fibrosis Carrier Study       SMA       AFP Maternal Serum        AFP Tetra       NIPT             Legend    ^: Historical                          Assessment:  Gestational Age:  39w2d  Labor State:   Labor, Active  Risk Factors:   none  Pregnancy Complications: none    Patient Active Problem List    Diagnosis Date Noted   • Indication for care in labor or delivery 2021   • Loss of appetite 2021   • Irregular menses 2021   • Obesity (BMI 30-39.9) 2019       Plan:   Admitted for: Active Labor  Anticipate   GBS negative    Liliane Silver M.D.

## 2021-12-16 NOTE — PROGRESS NOTES
Pt reports mild CTXs  Cx deferred (last ecam 7/70/-1 with thick meconium  CTXs q 4-5 minutes  FHTs reactive, reassuring, category I, no decels  Will start pitocin augmentation. R/B/A dsicussed wiht patient and she desires augmentation at this time.   Will follow    awestfall

## 2021-12-16 NOTE — CARE PLAN
Problem: Risk for Infection and Impaired Wound Healing  Goal: Patient will remain free from infection  Outcome: Progressing  Note: No s/s of infection noted, pt remains afebrile     Problem: Pain  Goal: Patient's pain will be alleviated or reduced to the patient’s comfort goal  Outcome: Progressing  Note: Pt denies need for pain intervention

## 2021-12-16 NOTE — PROGRESS NOTES
Came to assess pt on behalf of primary OB. Pt doing well, rare ctx, not feeling pressure. Ctx 5+ mins apart. GBS neg. Discussed AROM to help increase ctx frequency. Pt agrees. SVE: 7/70/-1, AROM +Mech. Pt tolerated procedure well. May need pit for augmentation should ctx frequency not increased.

## 2021-12-17 VITALS
WEIGHT: 210 LBS | HEIGHT: 66 IN | TEMPERATURE: 97.2 F | RESPIRATION RATE: 16 BRPM | HEART RATE: 97 BPM | OXYGEN SATURATION: 93 % | SYSTOLIC BLOOD PRESSURE: 114 MMHG | BODY MASS INDEX: 33.75 KG/M2 | DIASTOLIC BLOOD PRESSURE: 70 MMHG

## 2021-12-17 LAB
ERYTHROCYTE [DISTWIDTH] IN BLOOD BY AUTOMATED COUNT: 40.1 FL (ref 35.9–50)
HCT VFR BLD AUTO: 29.8 % (ref 37–47)
HGB BLD-MCNC: 9.7 G/DL (ref 12–16)
MCH RBC QN AUTO: 24.8 PG (ref 27–33)
MCHC RBC AUTO-ENTMCNC: 32.6 G/DL (ref 33.6–35)
MCV RBC AUTO: 76.2 FL (ref 81.4–97.8)
PLATELET # BLD AUTO: 251 K/UL (ref 164–446)
PMV BLD AUTO: 12.1 FL (ref 9–12.9)
RBC # BLD AUTO: 3.91 M/UL (ref 4.2–5.4)
WBC # BLD AUTO: 14.5 K/UL (ref 4.8–10.8)

## 2021-12-17 PROCEDURE — 85027 COMPLETE CBC AUTOMATED: CPT

## 2021-12-17 PROCEDURE — 36415 COLL VENOUS BLD VENIPUNCTURE: CPT

## 2021-12-17 RX ORDER — IBUPROFEN 600 MG/1
600 TABLET ORAL EVERY 6 HOURS PRN
Qty: 30 TABLET | COMMUNITY
Start: 2021-12-17 | End: 2022-05-10

## 2021-12-17 RX ORDER — VITAMIN A ACETATE, BETA CAROTENE, ASCORBIC ACID, CHOLECALCIFEROL, .ALPHA.-TOCOPHEROL ACETATE, DL-, THIAMINE MONONITRATE, RIBOFLAVIN, NIACINAMIDE, PYRIDOXINE HYDROCHLORIDE, FOLIC ACID, CYANOCOBALAMIN, CALCIUM CARBONATE, FERROUS FUMARATE, ZINC OXIDE, CUPRIC OXIDE 3080; 12; 120; 400; 1; 1.84; 3; 20; 22; 920; 25; 200; 27; 10; 2 [IU]/1; UG/1; MG/1; [IU]/1; MG/1; MG/1; MG/1; MG/1; MG/1; [IU]/1; MG/1; MG/1; MG/1; MG/1; MG/1
1 TABLET, FILM COATED ORAL
Qty: 30 TABLET | COMMUNITY
Start: 2021-12-17 | End: 2023-09-05

## 2021-12-17 RX ORDER — PSEUDOEPHEDRINE HCL 30 MG
100 TABLET ORAL 2 TIMES DAILY PRN
Qty: 60 CAPSULE | COMMUNITY
Start: 2021-12-17 | End: 2022-05-10

## 2021-12-17 ASSESSMENT — EDINBURGH POSTNATAL DEPRESSION SCALE (EPDS)
I HAVE FELT SAD OR MISERABLE: NO, NOT AT ALL
I HAVE BLAMED MYSELF UNNECESSARILY WHEN THINGS WENT WRONG: NOT VERY OFTEN
THINGS HAVE BEEN GETTING ON TOP OF ME: NO, I HAVE BEEN COPING AS WELL AS EVER
I HAVE BEEN SO UNHAPPY THAT I HAVE BEEN CRYING: NO, NEVER
I HAVE BEEN ANXIOUS OR WORRIED FOR NO GOOD REASON: HARDLY EVER
THE THOUGHT OF HARMING MYSELF HAS OCCURRED TO ME: NEVER
I HAVE BEEN SO UNHAPPY THAT I HAVE HAD DIFFICULTY SLEEPING: NOT AT ALL
I HAVE LOOKED FORWARD WITH ENJOYMENT TO THINGS: AS MUCH AS I EVER DID
I HAVE FELT SCARED OR PANICKY FOR NO GOOD REASON: NO, NOT AT ALL
I HAVE BEEN ABLE TO LAUGH AND SEE THE FUNNY SIDE OF THINGS: AS MUCH AS I ALWAYS COULD

## 2021-12-17 NOTE — DISCHARGE SUMMARY
Discharge Summary:      Renetta Nichols    Admit Date:   2021  Discharge Date:  2021     Admitting diagnosis:  Indication for care in labor or delivery [O75.9]  Discharge Diagnosis: Status post vaginal, spontaneous.  Pregnancy Complications: none  Tubal Ligation:  no        History:  Past Medical History:   Diagnosis Date   • Pregnant      OB History    Para Term  AB Living   3 3 1     1   SAB IAB Ectopic Molar Multiple Live Births           0 1      # Outcome Date GA Lbr Ashok/2nd Weight Sex Delivery Anes PTL Lv   3 Term 21 39w2d / 00:01 3.265 kg (7 lb 3.2 oz) M Vag-Spont None N CAITLIN   2 Para            1 Para                 Patient has no known allergies.  Patient Active Problem List    Diagnosis Date Noted   •  (spontaneous vaginal delivery) 2021   • Indication for care in labor or delivery 2021   • Loss of appetite 2021   • Irregular menses 2021   • Obesity (BMI 30-39.9) 2019        Hospital Course:   31 y.o. , now para 3, was admitted with the above mentioned diagnosis, underwent Active Labor, vaginal, spontaneous. Patient postpartum course was unremarkable, with progressive advancement in diet , ambulation and toleration of oral analgesia. Patient without complaints today and desires discharge.      Vitals:    21 1616 21 1719 21 2228 21 0200   BP: 139/85 123/79 118/67 105/69   Pulse: 79 73 82 83   Resp:  18 18 18   Temp:  36.6 °C (97.8 °F) 36.8 °C (98.2 °F) 36.6 °C (97.8 °F)   TempSrc:  Temporal Temporal Temporal   SpO2:  98% 95% 97%   Weight:       Height:           Current Facility-Administered Medications   Medication Dose   • LR infusion     • ondansetron (ZOFRAN ODT) dispertab 4 mg  4 mg    Or   • ondansetron (ZOFRAN) syringe/vial injection 4 mg  4 mg   • oxytocin (PITOCIN) infusion (for postpartum)   mL/hr   • oxytocin (PITOCIN) 20 UNITS/1000ML LR (induction of labor)  0.5-20 alex-units/min   • LR infusion      • PRN oxytocin (PITOCIN) (20 Units/1000 mL) PRN for excessive uterine bleeding - See Admin Instr  125-999 mL/hr   • miSOPROStol (CYTOTEC) tablet 800 mcg  800 mcg   • docusate sodium (COLACE) capsule 100 mg  100 mg   • prenatal plus vitamin (STUARTNATAL 1+1) 27-1 MG tablet 1 Tablet  1 Tablet   • ibuprofen (MOTRIN) tablet 600 mg  600 mg       Exam:    Abdomen: Abdomen soft, non-tender. Fundus firm below U  Fundus Non Tender: yes  Extremity: extremities, peripheral pulses and reflexes normal, no edema, redness or tenderness in the calves or thighs     Labs:  Recent Labs     12/16/21  1032 12/17/21  0055   WBC 11.2* 14.5*   RBC 4.32 3.91*   HEMOGLOBIN 10.6* 9.7*   HEMATOCRIT 33.1* 29.8*   MCV 76.6* 76.2*   MCH 24.5* 24.8*   MCHC 32.0* 32.6*   RDW 41.2 40.1   PLATELETCT 247 251   MPV 11.8 12.1        Activity:   Discharge to home  Pelvic Rest x 6 weeks    Assessment:  normal postpartum course  Discharge Assessment: OStable for D/c to home     Follow up: Dr. Silver in 6 weeks      Discharge Meds:   Current Outpatient Medications   Medication Sig Dispense Refill   • docusate sodium 100 MG Cap Take 100 mg by mouth 2 times a day as needed for Constipation. 60 Capsule    • ibuprofen (MOTRIN) 600 MG Tab Take 1 Tablet by mouth every 6 hours as needed. 30 Tablet    • prenatal plus vitamin (STUARTNATAL 1+1) 27-1 MG Tab tablet Take 1 Tablet by mouth. 30 Tablet        Marita Zapien M.D.

## 2021-12-17 NOTE — LACTATION NOTE
This note was copied from a baby's chart.  Discharging home today.  Experienced breastfeeding mom that is denying any concerns or any need for breastfeeding assistance prior to going home.  Denies nipple pain and instructed to call for LC assistance if any questions or concerns.

## 2021-12-17 NOTE — CARE PLAN
The patient is Stable - Low risk of patient condition declining or worsening    Shift Goals  Clinical Goals: rest, breastfeeding     Problem: Psychosocial - Postpartum  Goal: Patient will verbalize and demonstrate effective bonding and parenting behavior  Outcome: Progressing     Problem: Altered Physiologic Condition  Goal: Patient physiologically stable as evidenced by normal lochia, palpable uterine involution and vitals within normal limits  Outcome: Progressing  Note: VSS. Fundus firm and lochia light.

## 2021-12-17 NOTE — DISCHARGE INSTRUCTIONS
PATIENT DISCHARGE EDUCATION INSTRUCTION SHEET  REASONS TO CALL YOUR OBSTETRICIAN  · Persistent fever, shaking, chills (Temperature higher than 100.4) may indicate you have an infection  · Heavy bleeding: soaking more than 1 pad per hour; Passing clots an egg-sized clot or bigger may mean you have an postpartum hemorrhage  · Foul odor from vagina or bad smelling or discolored discharge or blood  · Breast infection (Mastitis symptoms); breast pain, chills, fever, redness or red streaks, may feel flu like symptoms  · Urinary pain, burning or frequency  · Incision that is not healing, increased redness, swelling, tenderness or pain, or any pus from episiotomy or  site may mean you have an infection  · Redness, swelling, warmth, or painful to touch in the calf area of your leg may mean you have a blood clot  · Severe or intensified depression, thoughts or feelings of wanting to hurt yourself or someone else   · Pain in chest, obstructed breathing or shortness of breath (trouble catching your breath) may mean you are having a postpartum complication. Call your provider immediately   · Headache that does not get better, even after taking medicine, a bad headache with vision changes or pain in the upper right area of your belly may mean you have high blood pressure or post birth preeclampsia. Call your provider immediately    HAND WASHING  All family and friends should wash their hands:  · Before and after holding the baby  · Before feeding the baby  · After using the restroom or changing the baby's diaper    WOUND CARE  Ask your physician for additional care instructions. In general:  ·  Incision:  · May shower and pat incision dry   · Keep the incision clean and dry  · There should not be any opening or pus from the incision  · Continue to walk at home 3 times a day   · Do NOT lift anything heavier than your baby (over 10 pounds)  · Encourage family to help participate in care of the  to allow  rest and mom time to heal  · Episiotomy/Laceration  · May use sarita-spray bottle, witch hazel pads and dermaplast spray for comfort  · Use sarita-spray bottle after urinating to cleanse perineal area  · To prevent burning during urination spray sarita-water bottle on labial area   · Pat perineal area dry until episiotomy/laceration is healed  · Continue to use sarita-bottle until bleeding stops as needed  · If have a 2nd degree laceration or greater, a Sitz bath can offer relief from soreness, burning, and inflammation   · Sitz Bath   · Sit in 6 inches of warm water and soak laceration as needed until the laceration heals    VAGINAL CARE AND BLEEDING  · Nothing inside vagina for 6 weeks:   · No sexual intercourse, tampons or douching  · Bleeding may continue for 2-4 weeks. Amount and color may vary  · Soaking 1 pad or more in an hour for several hours is considered heavy bleeding  · Passing large egg sized blood clots can be concerning  · If you feel like you have heavy bleeding or are having increasing amount of blood clots call your Obstetrician immediately  · If you begin feeling faint upon standing, feeling sick to your stomach, have clammy skin, a really fast heartbeat, have chills, start feeling confused, dizzy, sleepy or weak, or feeling like you're going to faint call your Obstetrician immediately    HYPERTENSION   Preeclampsia or gestational hypertension are types of high blood pressure that only pregnant women can get. It is important for you to be aware of symptoms to seek early intervention and treatment. If you have any of these symptoms immediately call your Obstetrician    · Vision changes or blurred vision   · Severe headache or pain that is unrelieved with medication and will not go away  · Persistent pain in upper abdomen or shoulder   · Increased swelling of face, feet, or hands  · Difficulty breathing or shortness of breath at rest  · Urinating less than usual    URINATION AND BOWEL MOVEMENTS  · Eating  "more fiber (bran cereal, fruits, and vegetables) and drinking plenty of fluids will help to avoid constipation  · Urinary frequency and urgency after childbirth is normal  · If you experience any urinary pain, burning or frequency call your provider    BIRTH CONTROL  · It is possible to become pregnant at any time after delivery and while breastfeeding  · Plan to discuss a method of birth control with your physician at your post delivery follow up visit    POSTPARTUM BLUES  During the first few days after birth, you may experience a sense of the \"blues\" which may include impatience, irritability or even crying. These feelings come and go quickly. However, as many as 1 in 10 women experience emotional symptoms known as postpartum depression.     POSTPARTUM DEPRESSION    May start as early as the second or third day after delivery or take several weeks or months to develop. Symptoms of \"blues\" are present, but are more intense: Crying spells; loss of appetite; feelings of hopelessness or loss of control; fear of touching the baby; over concern or no concern at all about the baby; little or no concern about your own appearance/caring for yourself; and/or inability to sleep or excessive sleeping. Contact your Obstetrician if you are experiencing any of these symptoms     PREVENTING SHAKEN BABY  If you are angry or stressed, PUT THE BABY IN THE CRIB, step away, take some deep breaths, and wait until you are calm to care for the baby. DO NOT SHAKE THE BABY. You are not alone, call a supporter for help.  · [facility] 24/7 crisis call line (459-949-3269) or (1-685.248.6459)  · You can also text them, text \"ANSWER\" (540527)      "

## 2021-12-17 NOTE — PROGRESS NOTES
1930 Assessment complete. VSS. Fundus firm, lochia light. Pain controlled with heat packs. FOB at bedside bonding with pt and baby. States voiding without difficulty. POC discussed. Encouraged to call with need. Call light in place

## 2021-12-17 NOTE — CARE PLAN
The patient is Stable - Low risk of patient condition declining or worsening         Progress made toward(s) clinical / shift goals:    Problem: Pain  Goal: Patient's pain will be alleviated or reduced to the patient’s comfort goal  Outcome: Progressing     Problem: Knowledge Deficit - Postpartum  Goal: Patient will verbalize and demonstrate understanding of self and infant care  Outcome: Progressing     Problem: Psychosocial - Postpartum  Goal: Patient will verbalize and demonstrate effective bonding and parenting behavior  Outcome: Progressing     Problem: Altered Physiologic Condition  Goal: Patient physiologically stable as evidenced by normal lochia, palpable uterine involution and vitals within normal limits  Outcome: Progressing     Patient is not progressing towards the following goals:

## 2021-12-17 NOTE — PROGRESS NOTES
S: Pt is PPD# 1 s/p , doing well. Pt is tolerating reg diet, passing gas, and voiding. Pt is ambulating. Pt reports good pain control. Nml Lochia.     O:   Vitals:    21 0200   BP: 105/69   Pulse: 83   Resp: 18   Temp: 36.6 °C (97.8 °F)   SpO2: 97%       GEN: NAD  Abd: Soft, non-tender, fundus firm below U  Ext: soft, NT, nml edema    Labs: 9.7/29.8 H/H    A/P: 31 y.o.  PPD# 1 doing well.  - Continue pain management  - Continue to encourage ambulation  - Continue routine PP care/support  - Anticipate D/C to home later today pending baby discharge

## 2021-12-17 NOTE — L&D DELIVERY NOTE
DATE OF SERVICE:  2021     DELIVERING PHYSICIAN:  Liliane Silver MD     PRIMARY OBSTETRICIAN:  Liliane Silver MD     BRIEF HISTORY:  This is a 31-year-old  3, para 2-0-0-2, who was   admitted at 39 and 2 with active labor.  Cervix dilated to 5 cm.  She was   admitted.  She progressed to 7 cm.  Amniotomy performed with thick meconium   noted.  She continued to progress well.  She had reactive fetal heart rate   tracing.  She progressed to complete, pushed with one contraction, proceeded   with delivery.  Fetal heart tracing is reactive, reassuring, category 1   throughout the entire labor and delivery process.  Group B strep culture   negative.     DELIVERY NOTE:  Delivery is normal spontaneous vaginal delivery.     FETAL POSITION:  Left occiput anterior.     COMPLICATIONS:  Nuchal cord x1 reduced on the perineum.  Placenta delivery   spontaneous.     CONDITION:  Intact 3-vessel cord.     LACERATIONS:  None.     ESTIMATED BLOOD LOSS:  300 mL.     Infant is male, Apgars 8 and 8 at 1 and 5 minutes respectively.  Weight is   pending at this time.  His name is Devang.     NARRATIVE:  Delivery was called, patient is feeling very pushy.  She was   placed in dorsal lithotomy position with the ensuing contraction. She   delivered the fetal vertex in a left occiput anterior presentation over intact   perineum.  Nuchal cord was discovered and easily reduced over the fetal   vertex and the remainder of the infant delivered spontaneously and a pink,   vigorous, crying infant handed to the mother's chest.  Approximately 2 minutes   after delivery, the cord was doubly clamped and cut by the father of the   baby.  The placenta then delivered spontaneously, carefully inspected, found   to be intact, 3-vessel cord.  IV Pitocin, bimanual massage, fundus firms up   well with normal amount of postpartum bleeding.  Vagina and perineum were   carefully inspected and found to be hemostatic, intact, and without  Pulmonary & Critical Care Medicine Consultation      ASSESSMENT AND PLAN:     Severe Sepsis due to pneumonia and urinary tract infection   Treating with antibiotics   Septic Shock    Supporting blood pressure with Levophed   Will add Vasopressin if Levophed dose is greater than 50 mcg per minute   He is on Hydrocortisone 50 mg IV every 8 hours for septic shock  Acute Hypoxic Respiratory Failure due to pneumonia and sepsis   Mechanical Ventilation with Assist Control   PEEP as needed   Propofol infusion for sedation using RASS    Fentanyl infusion for analgesia using CPOT   Right Lower Lobe Pneumonia   The antibiotics were changed to Meropenem and Vancomycin   Sputum Culture   Nasal MRSA DNA Probe   Repeat COVID-19 test is pending  Probable Urinary Tract Infection   Treating with Meropenem  Acute Kidney Injury   Management per Nephrology  Lactic Acidosis due to sepsis   Improved with IV fluids, vasopressors, and antibiotics   Cardiac Arrest 8/8/20 due to sepsis   ROSC obtained with epinephrine  Hematuria   Management per Urology   No anticoagulants for now  Coronary Artery Disease   Status post CABG   Hold aspirin due to hematuria   Continue Atorvastatin   Diabetes Mellitus with hyperglycemia   Sliding Scale Insulin       PROPHYLAXIS:   DVT Prophylaxis with Lower Extremity Sequential Compression Device   GI Prophylaxis with Protonix  Pneumonia Prophylaxis with Chlorhexidene    NUTRITION:   NPO for now    DISPOSITION:   ICU Patient         REASON FOR CONSULTATION: acute respiratory failure     CONSULTING PHYSICIAN: Dr. Lorenzo Galo:  Unable to obtain due to intubation and sedation. HISTORY OF PRESENT ILLNESS:  The patient was recently in the hospital.  He was sent to [facility] 8/7/20 and returned to ED due to altered mental status. He was diagnosed with severe sepsis due to urinary tract infection. He was treated with antibiotics. He developed shock and is on Levophed.     He had a   laceration.  At this time, all instruments, sponges removed.  Sponge counts   correct and the procedure was ended.  Both mother and baby tolerated delivery   well and are stable and bonding postpartum.        ______________________________  MD SHAKIRA Álvarez/ALEC    DD:  12/16/2021 15:18  DT:  12/16/2021 18:22    Job#:  [id number]   cardiac arrest today and ROSC obtained after epinephrine was given. He was intubated by Dr Cesario Andrews. Post intubation chest x-ray shows right lower lobe air space disease and ETT is OK. Nephrology has seen him today for acute kidney injury. Urology has seen him today for gross hematuria and recommends no anticoagulants. COVID-19 test was negative 8/7/20 and repeat test is pending. PAST MEDICAL HISTORY:  Past Medical History:   Diagnosis Date    A-fib [facility]) 7/12/2012    Acute encephalopathy     Acute kidney [facility] ([facility] [address] 8/3/2020    Altered mental status     Colon polyp     Glaucoma     HTN (hypertension) 4/2/2012    Hyperlipidemia     Hypertension     Osteoarthritis     S/P CABG x 4 2/12/2013    Type II or unspecified type diabetes mellitus without mention of complication, not stated as uncontrolled     Unspecified sleep apnea      PAST SURGICAL HISTORY:  Past Surgical History:   Procedure Laterality Date    COLONOSCOPY      EYE SURGERY      VASECTOMY  1974       FAMILY HISTORY:  family history includes Cancer in his mother; Diabetes in his mother; Emphysema in his father; Heart Disease in his brother. SOCIAL HISTORY:   reports that he quit smoking about 39 years ago. His smoking use included cigarettes. He has a 50.00 pack-year smoking history.  He has quit using smokeless tobacco.    MEDICATIONS:  Scheduled Meds:   atorvastatin  80 mg Oral Nightly    ferrous sulfate  325 mg Oral Daily with breakfast    latanoprost  1 drop Left Eye Nightly    insulin glargine  0.15 Units/kg Subcutaneous Nightly    insulin lispro  0.05 Units/kg Subcutaneous TID WC    insulin lispro  0-6 Units Subcutaneous TID WC    insulin lispro  0-3 Units Subcutaneous Nightly    sodium chloride flush  10 mL Intravenous 2 times per day    brimonidine  1 drop Both Eyes Daily    And    timolol  1 drop Both Eyes Daily    lidocaine 1 % injection  5 mL Intradermal Once    sodium chloride flush  10 mL UROBILINOGEN 1.0   BILIRUBINUR SMALL*   BLOODU LARGE*   GLUCOSEU 500*       Results for Wendy Bermudez (MRN [id number]) as of 8/8/2020 13:45   Ref. Range 8/8/2020 02:30 8/8/2020 05:05   Lactic Acid Latest Ref Range: 0.4 - 2.0 mmol/L 6.5 (HH) 3.5 (H)       Results for Wendy Bermudez (MRN [id number]) as of 8/8/2020 13:45   Ref. Range 8/8/2020 00:20   Procalcitonin Latest Ref Range: 0.00 - 0.15 ng/mL 12.49 (H)         CHEST XRAY PORTABLE:   Results for orders placed during the hospital encounter of 08/07/20   XR CHEST PORTABLE    Narrative EXAMINATION:  ONE XRAY VIEW OF THE CHEST    8/8/2020 11:17 am    COMPARISON:  08/08/2020 at 0025 hours, 08/03/2020    HISTORY:  ORDERING SYSTEM PROVIDED HISTORY: dyspnea  TECHNOLOGIST PROVIDED HISTORY:  Reason for exam:->dyspnea  Reason for Exam: dyspnea  Acuity: Acute  Type of Exam: Initial    FINDINGS:  A single frontal view of the chest was obtained. There is no acute skeletal  abnormality. The patient is status post median sternotomy with multiple  mediastinal clips noted. The heart size is stable, and at the upper limits  normal.  The mediastinal contours are within normal limits. There is stable  mild elevation of the right hemidiaphragm. There is mild right basilar  atelectasis, slightly progressed from the study earlier today. The lungs are  otherwise clear. There is no evidence of a pneumothorax. Impression 1. Slight interval progression of mild right basilar atelectasis. 2. Stable mild elevation of the right hemidiaphragm. CRITICAL CARE TIME:  I was at the bedside or in the ICU for 50 minutes providing Critical Care to Edgardo Warren.

## 2022-01-26 ENCOUNTER — POST PARTUM (OUTPATIENT)
Dept: OBGYN | Facility: CLINIC | Age: 32
End: 2022-01-26
Payer: COMMERCIAL

## 2022-01-26 VITALS — BODY MASS INDEX: 32.93 KG/M2 | SYSTOLIC BLOOD PRESSURE: 143 MMHG | WEIGHT: 204 LBS | DIASTOLIC BLOOD PRESSURE: 74 MMHG

## 2022-01-26 PROCEDURE — 0503F POSTPARTUM CARE VISIT: CPT | Performed by: OBSTETRICS & GYNECOLOGY

## 2022-01-26 NOTE — PROGRESS NOTES
Post-Partum Visit    CC: post-partum    HPI: 32 y.o.  s/p vaginal, spontaneous on 21 @ 39w2d w/ Dr Silver.  No complications around delivery.   Prenatal course uncomplicated    Pt reports they are all doing well.   BFing: yes. Baby josue Siddiqi is gaining weight and eating and sleeping well    No menses yet. Light spotting still. has not had intercourse. States they have never used contraception and all pregnancies were planned      EDPDS: 0  She denies SI/HI    Last pap:2021 WNL HPV negative    ROS:  gen: denies general concerns, fevers  Breast: denies pain, redness, concerns  abd: denies abd pain, GI concerns  : denies vaginal bleeding, discharge, pain    Past Medical History:   Diagnosis Date   • Pregnant        Physical Exam:  /74   Wt 92.5 kg (204 lb)   LMP 2021   BMI 32.93 kg/m²   gen: AAO, NAD  Breasts: symmetric, no masses, nontender, no skin changes, breast feeding changes present  abd: soft, NT, ND, no masses  : NEFG, normal vagina and cervix,    Uterus small, nontender, anteverted, adnexa unable to palpate   Ext: NT, no edema    A/P: 32 y.o.  s/p vaginal, spontaneous  - pap up to date  - BFing, encouraged PNV use, lactation if needed  - no signs of postpartum depression  - contraception: refuses    RTC annually/PRN

## 2022-02-01 ENCOUNTER — TELEPHONE (OUTPATIENT)
Dept: OBGYN | Facility: CLINIC | Age: 32
End: 2022-02-01

## 2022-02-01 NOTE — TELEPHONE ENCOUNTER
Pt calls requesting note to return to work as of 1/27/22. Note is faxed to #[phone].[facility]   n/a yes

## 2022-05-10 ENCOUNTER — OFFICE VISIT (OUTPATIENT)
Dept: MEDICAL GROUP | Facility: PHYSICIAN GROUP | Age: 32
End: 2022-05-10
Payer: COMMERCIAL

## 2022-05-10 VITALS
OXYGEN SATURATION: 97 % | WEIGHT: 214 LBS | HEIGHT: 66 IN | RESPIRATION RATE: 18 BRPM | SYSTOLIC BLOOD PRESSURE: 104 MMHG | BODY MASS INDEX: 34.39 KG/M2 | DIASTOLIC BLOOD PRESSURE: 70 MMHG | TEMPERATURE: 98 F | HEART RATE: 86 BPM

## 2022-05-10 DIAGNOSIS — Z00.00 WELLNESS EXAMINATION: Primary | ICD-10-CM

## 2022-05-10 DIAGNOSIS — L65.9 HAIR LOSS: ICD-10-CM

## 2022-05-10 PROCEDURE — 99395 PREV VISIT EST AGE 18-39: CPT | Performed by: PHYSICIAN ASSISTANT

## 2022-05-10 ASSESSMENT — PATIENT HEALTH QUESTIONNAIRE - PHQ9: CLINICAL INTERPRETATION OF PHQ2 SCORE: 0

## 2022-05-10 NOTE — ASSESSMENT & PLAN NOTE
Patient has been noticing some hair loss since the birth of her son 4 months ago. Mostly in the front. Still taking prenatal.

## 2022-05-10 NOTE — PROGRESS NOTES
"Subjective:     CC: wellness exam    HPI:   Renetta presents today for a wellness examination.  She is currently almost 5 months postpartum from a vaginal delivery.  Reports that she is doing well but has noted some hair loss recently.    Hair loss  Patient has been noticing some hair loss since the birth of her son 4 months ago. Mostly in the front. Still taking prenatal.       Past Medical History:   Diagnosis Date   • Pregnant     RESOLVED/DELIVERED       Social History     Tobacco Use   • Smoking status: Never Smoker   • Smokeless tobacco: Never Used   Vaping Use   • Vaping Use: Never used   Substance Use Topics   • Alcohol use: Not Currently   • Drug use: No       Current Outpatient Medications Ordered in Epic   Medication Sig Dispense Refill   • prenatal plus vitamin (STUARTNATAL 1+1) 27-1 MG Tab tablet Take 1 Tablet by mouth. 30 Tablet      No current Epic-ordered facility-administered medications on file.       Allergies:  Patient has no known allergies.    Health Maintenance: Completed    ROS:  Gen: no fevers/chills  Eyes: no changes in vision  ENT: no sore throat  Pulm: no sob, no cough  CV: no chest pain  GI: no nausea/vomiting  : no dysuria  MSk: no myalgias  Skin: no rash, positive for hair loss  Neuro: no headaches  Psych: no depression, no anxiety    Objective:       Exam:  /70   Pulse 86   Temp 36.7 °C (98 °F) (Temporal)   Resp 18   Ht 1.676 m (5' 6\")   Wt 97.1 kg (214 lb)   SpO2 97%   BMI 34.54 kg/m²  Body mass index is 34.54 kg/m².    Gen: Alert and oriented, No apparent distress.  Skin: Warm, dry, good turgor, no rashes in visible areas.  Mild hair thinning along frontal hairline  HEENT: Normocephalic. Eyes conjunctiva clear lids without ptosis, pupils equal and reactive to light accommodation, ears normal shape and contour  Neck: Trachea midline, no masses, no thyromegaly  Lungs: Normal effort, CTA bilaterally, no wheezes, rhonchi, or rales  CV: Regular rate and rhythm. No murmurs, " rubs, or gallops.  MSK: Normal gait, moves all extremities.  Neuro: Grossly non-focal.  Ext: No clubbing, cyanosis, edema.  Psych: Alert and oriented x3, normal affect and mood.     Labs: Labs from 12/16/2021 were reviewed and discussed with the patient. All questions were answered.     Assessment & Plan:     32 y.o. female with the following -     1. Wellness examination  HCM: UTD.  Labs per orders.  Vaccinations: UTD.  Counseling about diet, supplements, and routine exercise given.  - Comp Metabolic Panel; Future  - Lipid Profile; Future  - TSH WITH REFLEX TO FT4; Future  - CBC WITHOUT DIFFERENTIAL; Future    2. Hair loss  Highly suspect postpartum hair loss.  Will order blood work to evaluate further, recommended patient continue with prenatal vitamin.  Also recommended looking into possibly doing hair serum or hair care directed at prenatal hearing loss.  Would expect hair loss over the next 6 to 12 months.  - Comp Metabolic Panel; Future  - VITAMIN D,25 HYDROXY; Future  - TSH WITH REFLEX TO FT4; Future  - IRON/TOTAL IRON BIND; Future  - CBC WITHOUT DIFFERENTIAL; Future  - VITAMIN B12; Future    I spent a total of 25  minutes with record review (including external notes and labs), exam, communication with the patient, communication with other providers, and documentation of this encounter.     Return for annual wellness or sooner if needed.    Please note that this dictation was created using voice recognition software. I have made every reasonable attempt to correct obvious errors, but I expect that there are errors of grammar and possibly content that I did not discover before finalizing the note.    Electronically signed by Glendy Esquivel PA-C on May 10, 2022

## 2023-04-04 ENCOUNTER — OCCUPATIONAL MEDICINE (OUTPATIENT)
Dept: OCCUPATIONAL MEDICINE | Facility: CLINIC | Age: 33
End: 2023-04-04
Payer: COMMERCIAL

## 2023-04-04 VITALS
HEIGHT: 66 IN | SYSTOLIC BLOOD PRESSURE: 116 MMHG | OXYGEN SATURATION: 98 % | DIASTOLIC BLOOD PRESSURE: 74 MMHG | RESPIRATION RATE: 20 BRPM | BODY MASS INDEX: 34.55 KG/M2 | HEART RATE: 86 BPM | WEIGHT: 215 LBS

## 2023-04-04 DIAGNOSIS — M72.2 PLANTAR FASCIITIS OF RIGHT FOOT: ICD-10-CM

## 2023-04-04 PROCEDURE — 99203 OFFICE O/P NEW LOW 30 MIN: CPT | Performed by: PREVENTIVE MEDICINE

## 2023-04-04 NOTE — LETTER
"EMPLOYEE’S CLAIM FOR COMPENSATION/ REPORT OF INITIAL TREATMENT  FORM C-4  PLEASE TYPE OR PRINT    EMPLOYEE’S CLAIM - PROVIDE ALL INFORMATION REQUESTED   First Name  Renetta Last Name  Simone Birthdate                    1990                Sex  female Claim Number (Insurer’s Use Only)   Home Address  [address] Age  33 y.o. Height  1.676 m (5' 6\") Weight  97.5 kg (215 lb) [facility]     [facility] Zip  [address] Telephone  [phone] (home)    Mailing Address  [facility] [address] [facility] Zip  [address] Primary Language Spoken  English    INSURER   THIRD-PARTY      [employer]   Employee's Occupation (Job Title) When Injury or Occupational Disease Occurred      Employer's Name/Company Name  [employer]  Telephone  [phone]    Office Mail Address (Number and Street)  [address]        Date of Injury  2/17/2023               Hours Injury  10:00 AM Date Employer Notified  3/17/2023 Last Day of Work after Injury or Occupational Disease  4/4/2023 Supervisor to Whom Injury     Reported  Alexandre Bowers   Address or Location of Accident (if applicable)  Work [1]   What were you doing at the time of accident? (if applicable)  N/A    How did this injury or occupational disease occur? (Be specific and answer in detail. Use additional sheet if necessary)  Began feeling sharp pain on right heel over a period of time while standing at work.   If you believe that you have an occupational disease, when did you first have knowledge of the disability and its relationship to your employment?   Witnesses to the Accident (if applicable)  Sal[place] Leon ()      Nature of Injury or Occupational Disease  Sprain  Part(s) of Body Injured or Affected  Foot (R), ,     I CERTIFY THAT THE ABOVE IS TRUE AND CORRECT TO T HE BEST OF MY KNOWLEDGE AND THAT I HAVE PROVIDED THIS INFORMATION IN ORDER TO OBTAIN THE " BENEFITS OF [place], OR [place]).  I HEREBY AUTHORIZE ANY PHYSICIAN, CHIROPRACTOR, SURGEON, PRACTITIONER OR ANY OTHER PERSON, ANY HOSPITAL, INCLUDING [facility] OR [facility], ANY  MEDICAL SERVICE ORGANIZATION, ANY INSURANCE COMPANY, OR OTHER INSTITUTION OR ORGANIZATION TO RELEASE TO EACH OTHER, ANY MEDICAL OR OTHER INFORMATION, INCLUDING BENEFITS PAID OR PAYABLE, PERTINENT TO THIS INJURY OR DISEASE, EXCEPT INFORMATION RELATIVE TO DIAGNOSIS, TREATMENT AND/OR COUNSELING FOR AIDS, PSYCHOLOGICAL CONDITIONS, ALCOHOL OR CONTROLLED SUBSTANCES, FOR WHICH I MUST GIVE SPECIFIC AUTHORIZATION.  A PHOTOSTAT OF THIS AUTHORIZATION SHALL BE VALID AS THE ORIGINAL.       Date   Place Employee’s Original or  *Electronic Signature   THIS REPORT MUST BE COMPLETED AND MAILED WITHIN 3 WORKING DAYS OF TREATMENT   Place  [facility]   Date  4/4/2023 Diagnosis and Description of Injury or Occupational Disease  (M72.2) Plantar fasciitis of right foot Is there evidence that the injured employee was under the influence of alcohol and/or another controlled substance at the time of accident?  ? No ? Yes (if yes, please explain)   Hour  11:31 AM   The encounter diagnosis was Plantar fasciitis of right foot. No   Treatment     Have you advised the patient to remain off work five days or     more?    X-Ray Findings      ? Yes Indicate dates:   From   To      From information given by the employee, together with medical evidence, can        you directly connect this injury or occupational disease as job incurred?  No ? No If no, is the injured employee capable of:  ? full duty  No ? modified duty  Yes   Is additional medical care by a physician indicated?  Yes If modified duty, specify any limitations / restrictions  Seated work 25% shift   Do you know of any previous injury or disease  "contributing to this condition or occupational disease?  ? Yes ? No (Explain if yes)                          No   Date  4/4/2023 Print Health Care Provider's  Name  Helio Esquviel D.O. I certify that the employer’s copy of  this form was delivered to the employer on:   Address  [address],   [address] Insurer’s Use Only     [facility] [place] [facility] Zip  [address]    Provider’s Tax ID Number  [id number] Telephone  Dept: [phone]             Health Care Provider’s Original or Electronic Signature  e-SignTAYLOR, HELIO GIBSON D.O. Degree (MD,DO, DC,PARAINE,APRN)  DO      * Complete and attach Release of Information (Form C-4A) when injured employee signs C-4 Form electronically  ORIGINAL - TREATING HEALTHCARE PROVIDER PAGE 2 - INSURER/TPA PAGE 3 - EMPLOYER PAGE 4 - EMPLOYEE             Form C-4 (rev.08/21)           BRIEF DESCRIPTION OF RIGHTS AND BENEFITS  (Pursuant to [place] 616[place].050)    Notice of Injury or Occupational Disease (Incident Report Form C-1): If an injury or occupational disease (OD) arises out of and in the course of employment, you must provide written notice to your employer as soon as practicable, but no later than 7 days after the accident or OD. Your employer shall maintain a sufficient supply of the required forms.    Claim for Compensation (Form C-4): If medical treatment is sought, the form C-4 is available at the place of initial treatment. A completed \"Claim for Compensation\" (Form C-4) must be filed within 90 days after an accident or OD. The treating physician or chiropractor must, within 3 working days after treatment, complete and mail to the employer, the employer's insurer and third-party , the Claim for Compensation.    Medical Treatment: If you require medical treatment for your on-the-job injury or OD, you may be required to select a physician or chiropractor from a list provided by your workers’ compensation insurer, if it has contracted with an Organization for Managed Care (MCO) " or Preferred Provider Organization (PPO) or providers of health care. If your employer has not entered into a contract with an MCO or PPO, you may select a physician or chiropractor from the Panel of Physicians and Chiropractors. Any medical costs related to your industrial injury or OD will be paid by your insurer.    Temporary Total Disability (TTD): If your doctor has certified that you are unable to work for a period of at least 5 consecutive days, or 5 cumulative days in a 20-day period, or places restrictions on you that your employer does not accommodate, you may be entitled to TTD compensation.    Temporary Partial Disability (TPD): If the wage you receive upon reemployment is less than the compensation for TTD to which you are entitled, the insurer may be required to pay you TPD compensation to make up the difference. TPD can only be paid for a maximum of 24 months.    Permanent Partial Disability (PPD): When your medical condition is stable and there is an indication of a PPD as a result of your injury or OD, within 30 days, your insurer must arrange for an evaluation by a rating physician or chiropractor to determine the degree of your PPD. The amount of your PPD award depends on the date of injury, the results of the PPD evaluation, your age and wage.    Permanent Total Disability (PTD): If you are medically certified by a treating physician or chiropractor as permanently and totally disabled and have been granted a PTD status by your insurer, you are entitled to receive monthly benefits not to exceed 66 2/3% of your average monthly wage. The amount of your PTD payments is subject to reduction if you previously received a lump-sum PPD award.    Vocational Rehabilitation Services: You may be eligible for vocational rehabilitation services if you are unable to return to the job due to a permanent physical impairment or permanent restrictions as a result of your injury or occupational  disease.    Transportation and Per Messi Reimbursement: You may be eligible for travel expenses and per messi associated with medical treatment.    Reopening: You may be able to reopen your claim if your condition worsens after claim closure.     Appeal Process: If you disagree with a written determination issued by the insurer or the insurer does not respond to your request, you may appeal to the Department of Administration, , by following the instructions contained in your determination letter. You must appeal the determination within 70 days from the date of the determination letter at [address], [address], [place], [place] [address], or [address], [address], [place], [place] [address]. If you disagree with the  decision, you may appeal to the Department of Administration, . You must file your appeal within 30 days from the date of the  decision letter at [address], [address], [place], [place] [address], or [address], [place], [place] [address]. If you disagree with a decision of an , you may file a petition for judicial review with the District Court. You must do so within 30 days of the Appeal Officer’s decision. You may be represented by an  at your own expense or you may contact the [facility] for possible representation.    [place]  for Injured Workers (NAIW): If you disagree with a  decision, you may request that NAIW represent you without charge at an  Hearing. For information regarding denial of benefits, you may contact the [facility] at: [address], [address], [place] [address], [phone], or [address], [place] [address], [phone]    To File a Complaint with the Division: If you wish to file a complaint with the  of the Division of Industrial Relations (DIR),  please contact the  Workers’ Compensation Section, [address], [address], [place], [place] [address], telephone ([phone] [phone], or [address], [address], [place], [place] [address], telephone [phone].    For assistance with Workers’ Compensation Issues: You may contact the [facility] Office for Consumer Health Assistance, [address], [address], [place], [place] [address], Toll Free 1-427.792.8110, Web site: http://[facility].[place].gov/Programs/MARTHA E-mail: martha@[facility].nv.gov              __________________________________________________________________                                    _________________            Employee Name / Signature                                                                                                                            Date                                                                                                                                                                                                                              D-2 (rev. 10/20)

## 2023-04-04 NOTE — LETTER
[address],   [address] [place] [address]  Phone:  [phone] - Fax:  [phone]   Occupational Health [facility] Progress Report and Disability Certification  Date of Service: 4/4/2023   No Show:  No  Date / Time of Next Visit: 4/25/2023 @&:45Am   Claim Information   Patient Name: Renetta Nichols  Claim Number:     Employer: [employer]  Date of Injury: 2/17/2023     Insurer / TPA:   [employer] ID / SSN:     Occupation:   Diagnosis: The encounter diagnosis was Plantar fasciitis of right foot.    Medical Information   Related to Industrial Injury? 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  JENNA: She states that they have been particular busy at work and had been walking around the time on her feet around the date of injury.  She then noted gradual onset of right heel pain.  Symptoms have been gradually worsening with prolonged standing and walking.  She states on weeks that appears the the pain is much more severe.  She has not tried any medications or other treatments yet.  She did try changing shoes but did not change inserts.  Pain is generally a little bit better at the beginning of the week and worsens throughout the workweek.  Denies prior similar symptoms.   Objective Findings: Right foot: Some tenderness over the base of the heel.  Full range of motion of foot and ankle.  Strength intact.  Normal gait.   Pre-Existing Condition(s):     Assessment:   Condition Same    Status: Additional Care Required  Permanent Disability:No    Plan:      Diagnostics:      Comments:  Recommend OTC naproxen 2 tablets twice daily  Gentle stretches as demonstrated.  Big toe stretching and standing calf stretch.  At least twice daily  Recommend getting new inserts specific for Planter fasciitis for shoes  Advised ice massage, freeze a plastic water bottle and massage heel and sole of foot  Restricted  duty  Follow-up 3 weeks    Disability Information   Status: Released to Restricted Duty    From:  4/4/2023  Through: 4/25/2023 Restrictions are: Temporary   Physical Restrictions   Sitting:    Standing:    Stooping:    Bending:      Squatting:    Walking:    Climbing:    Pushing:      Pulling:    Other:    Reaching Above Shoulder (L):   Reaching Above Shoulder (R):       Reaching Below Shoulder (L):    Reaching Below Shoulder (R):      Not to exceed Weight Limits   Carrying(hrs):   Weight Limit(lb):   Lifting(hrs):   Weight  Limit(lb):     Comments: Seated work 25% shift    Repetitive Actions   Hands: i.e. Fine Manipulations from Grasping:     Feet: i.e. Operating Foot Controls:     Driving / Operate Machinery:     Health Care Provider’s Original or Electronic Signature  Helio Esquivel D.O. Health Care Provider’s Original or Electronic Signature    Helio Esquivel DO MPH     Clinic Name / Location: [address],   [address] [address] Clinic Phone Number: Dept: [phone]   Appointment Time: 11:15 Am Visit Start Time: 11:31 AM   Check-In Time:  11:39 Am Visit Discharge Time:  11:59AM   Original-Treating Physician or Chiropractor    Page 2-Insurer/TPA    Page 3-Employer    Page 4-Employee

## 2023-04-04 NOTE — PROGRESS NOTES
"Subjective:     Renetta Nichols is a 33 y.o. female who presents for No chief complaint on file.      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  JENNA: She states that they have been particular busy at work and had been walking around the time on her feet around the date of injury.  She then noted gradual onset of right heel pain.  Symptoms have been gradually worsening with prolonged standing and walking.  She states on weeks that appears the the pain is much more severe.  She has not tried any medications or other treatments yet.  She did try changing shoes but did not change inserts.  Pain is generally a little bit better at the beginning of the week and worsens throughout the workweek.  Denies prior similar symptoms.    ROS: All systems were reviewed on intake form, form was reviewed and signed. See scanned documents in media. Pertinent positives and negatives included in HPI.    PMH: No pertinent past medical history to this problem  MEDS: Medications were reviewed in Epic  ALLERGIES: No Known Allergies  SOCHX: Works as a  at [employer]  FH: No pertinent family history to this problem       Objective:     /74   Pulse 86   Resp 20   Ht 1.676 m (5' 6\")   Wt 97.5 kg (215 lb)   SpO2 98%   BMI 34.70 kg/m²     Constitutional: Patient is in no acute distress. Appears well-developed and well-nourished.   HENT: Normocephalic and atraumatic. EOM are normal. No scleral icterus.   Cardiovascular: Normal rate.    Pulmonary/Chest: Effort normal. No respiratory distress.   Neurological: Patient is alert and oriented to person, place, and time.   Skin: Skin is warm and dry.   Psychiatric: Normal mood and affect. Behavior is normal.     Right foot: Some tenderness over the base of the heel.  Full range of motion of foot and ankle.  Strength intact.  Normal gait.    Assessment/Plan:       1. Plantar fasciitis of right foot    Released to Restricted Duty FROM 4/4/2023 TO " 4/25/2023  Seated work 25% shift  Recommend OTC naproxen 2 tablets twice daily  Gentle stretches as demonstrated.  Big toe stretching and standing calf stretch.  At least twice daily  Recommend getting new inserts specific for Planter fasciitis for shoes  Advised ice massage, freeze a plastic water bottle and massage heel and sole of foot  Restricted duty  Follow-up 3 weeks    Differential diagnosis, natural history, supportive care, and indications for immediate follow-up discussed.    Approximately 35 minutes were spent in reviewing notes, preparing for visit, obtaining history, exam and evaluation, patient counseling/education and post visit documentation/orders.

## 2023-04-25 ENCOUNTER — OCCUPATIONAL MEDICINE (OUTPATIENT)
Dept: OCCUPATIONAL MEDICINE | Facility: CLINIC | Age: 33
End: 2023-04-25
Payer: COMMERCIAL

## 2023-04-25 VITALS
RESPIRATION RATE: 16 BRPM | HEIGHT: 66 IN | HEART RATE: 80 BPM | TEMPERATURE: 97.9 F | SYSTOLIC BLOOD PRESSURE: 128 MMHG | DIASTOLIC BLOOD PRESSURE: 84 MMHG | BODY MASS INDEX: 34.55 KG/M2 | WEIGHT: 215 LBS | OXYGEN SATURATION: 97 %

## 2023-04-25 DIAGNOSIS — M72.2 PLANTAR FASCIITIS OF RIGHT FOOT: ICD-10-CM

## 2023-04-25 PROCEDURE — 99213 OFFICE O/P EST LOW 20 MIN: CPT | Performed by: PREVENTIVE MEDICINE

## 2023-04-25 ASSESSMENT — PAIN SCALES - GENERAL: PAINLEVEL: 5=MODERATE PAIN

## 2023-04-25 NOTE — LETTER
[address],   [address] [place] [address]  Phone:  [phone] - Fax:  [phone]   Occupational Health [facility] Progress Report and Disability Certification  Date of Service: 4/25/2023   No Show:  No  Date / Time of Next Visit: 5/16/2023@ 7:45 AM    Claim Information   Patient Name: Renetta Nichols  Claim Number:     Employer: [employer]  Date of Injury: 2/17/2023     Insurer / TPA: [employer]  ID / SSN:     Occupation:   Diagnosis: The encounter diagnosis was Plantar fasciitis of right foot.    Medical Information   Related to Industrial Injury? 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  JENNA: She states that they have been particular busy at work and had been walking around the time on her feet around the date of injury.  She then noted gradual onset of right heel pain.  Patient states overall she is had some gradual improvement in pain at the heel.  She states they are switching her to a different line and so she is unsure what her work load would be like.  She states her supervisor is keeping her within the work restrictions and that is been going well.   Objective Findings: Right foot: Full range of motion of foot and ankle.  Strength intact.  Normal gait.   Pre-Existing Condition(s):     Assessment:   Condition Improved    Status: Additional Care Required  Permanent Disability:No    Plan:      Diagnostics:      Comments:  Recommend OTC naproxen 2 tablets twice daily  Gentle stretches as demonstrated.  Big toe stretching and standing calf stretch.  At least twice daily  Advised ice massage, freeze a plastic water bottle and massage heel and sole of foot  Restricted duty  Follow-up 3 weeks, if improvement continues will consider released to full duty    Disability Information   Status: Released to Restricted Duty    From:  4/25/2023  Through: 5/16/2023 Restrictions are:  Temporary   Physical Restrictions   Sitting:    Standing:    Stooping:    Bending:      Squatting:    Walking:    Climbing:    Pushing:      Pulling:    Other:    Reaching Above Shoulder (L):   Reaching Above Shoulder (R):       Reaching Below Shoulder (L):    Reaching Below Shoulder (R):      Not to exceed Weight Limits   Carrying(hrs):   Weight Limit(lb):   Lifting(hrs):   Weight  Limit(lb):     Comments: Seated work 25% shift    Repetitive Actions   Hands: i.e. Fine Manipulations from Grasping:     Feet: i.e. Operating Foot Controls:     Driving / Operate Machinery:     Health Care Provider’s Original or Electronic Signature  Helio Esquivel D.O. Health Care Provider’s Original or Electronic Signature    Helio Esquivel DO MPH     Clinic Name / Location: [address],   [address] [place], [place] [address] [address] Clinic Phone Number: Dept: [phone]   Appointment Time: 7:45 Am Visit Start Time: 7:51 AM   Check-In Time:  7:49 Am Visit Discharge Time:  8:05 AM   Original-Treating Physician or Chiropractor    Page 2-Insurer/TPA    Page 3-Employer    Page 4-Employee

## 2023-04-25 NOTE — PROGRESS NOTES
"Subjective:     Renetta Nichols is a 33 y.o. 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  JENNA: She states that they have been particular busy at work and had been walking around the time on her feet around the date of injury.  She then noted gradual onset of right heel pain.  Patient states overall she is had some gradual improvement in pain at the heel.  She states they are switching her to a different line and so she is unsure what her work load would be like.  She states her supervisor is keeping her within the work restrictions and that is been going well.    ROS    SOCHX: Works as a  at [employer]  FH: No pertinent family history to this problem.       Objective:     /84   Pulse 80   Temp 36.6 °C (97.9 °F) (Temporal)   Resp 16   Ht 1.676 m (5' 6\")   Wt 97.5 kg (215 lb)   SpO2 97%   BMI 34.70 kg/m²     Constitutional: Patient is in no acute distress. Appears well-developed and well-nourished.   Cardiovascular: Normal rate.    Pulmonary/Chest: Effort normal. No respiratory distress.   Neurological: Patient is alert and oriented to person, place, and time.   Skin: Skin is warm and dry.   Psychiatric: Normal mood and affect. Behavior is normal.     Right foot: Full range of motion of foot and ankle.  Strength intact.  Normal gait.    Assessment/Plan:       1. Plantar fasciitis of right foot    Released to Restricted Duty FROM 4/25/2023 TO 5/16/2023  Seated work 25% shift    Recommend OTC naproxen 2 tablets twice daily  Gentle stretches as demonstrated.  Big toe stretching and standing calf stretch.  At least twice daily  Advised ice massage, freeze a plastic water bottle and massage heel and sole of foot  Restricted duty  Follow-up 3 weeks, if improvement continues will consider released to full duty    Differential diagnosis, natural history, supportive care, and " indications for immediate follow-up discussed.    Approximately 25 minutes was spent in preparing for visit, obtaining history, exam and evaluation, patient counseling/education and post visit documentation/orders.

## 2023-09-05 ENCOUNTER — OFFICE VISIT (OUTPATIENT)
Dept: MEDICAL GROUP | Facility: PHYSICIAN GROUP | Age: 33
End: 2023-09-05
Payer: COMMERCIAL

## 2023-09-05 VITALS
HEART RATE: 75 BPM | BODY MASS INDEX: 34.91 KG/M2 | WEIGHT: 209.5 LBS | RESPIRATION RATE: 16 BRPM | DIASTOLIC BLOOD PRESSURE: 66 MMHG | TEMPERATURE: 98 F | HEIGHT: 65 IN | SYSTOLIC BLOOD PRESSURE: 100 MMHG | OXYGEN SATURATION: 98 %

## 2023-09-05 DIAGNOSIS — L65.9 HAIR LOSS: ICD-10-CM

## 2023-09-05 DIAGNOSIS — E66.9 OBESITY (BMI 30-39.9): ICD-10-CM

## 2023-09-05 DIAGNOSIS — Z11.59 NEED FOR HEPATITIS C SCREENING TEST: ICD-10-CM

## 2023-09-05 PROCEDURE — 3074F SYST BP LT 130 MM HG: CPT | Performed by: PHYSICIAN ASSISTANT

## 2023-09-05 PROCEDURE — 99214 OFFICE O/P EST MOD 30 MIN: CPT | Performed by: PHYSICIAN ASSISTANT

## 2023-09-05 PROCEDURE — 3078F DIAST BP <80 MM HG: CPT | Performed by: PHYSICIAN ASSISTANT

## 2023-09-05 ASSESSMENT — ENCOUNTER SYMPTOMS
SHORTNESS OF BREATH: 0
CHILLS: 0
FEVER: 0

## 2023-09-05 ASSESSMENT — PATIENT HEALTH QUESTIONNAIRE - PHQ9: CLINICAL INTERPRETATION OF PHQ2 SCORE: 0

## 2023-09-05 NOTE — PROGRESS NOTES
"Subjective:     CC:     HPI:   Renetta, patient of Glendy Esquivel PA-C, presents today for the following:    Hair loss x 5 months  History of the same after she had her child, who will be 2 in December  Hair loss resolved a few months post partum.   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   Respiratory:  Negative for shortness of breath.    Cardiovascular:  Negative for chest pain.       Objective:     Exam:  /66 (BP Location: Left arm, Patient Position: Sitting, BP Cuff Size: Large adult)   Pulse 75   Temp 36.7 °C (98 °F) (Temporal)   Resp 16   Ht 1.651 m (5' 5\")   Wt 95 kg (209 lb 8 oz)   LMP 08/29/2023 (Approximate)   SpO2 98%   Breastfeeding Yes   BMI 34.86 kg/m²  Body mass index is 34.86 kg/m².    Physical Exam  Vitals reviewed.   Constitutional:       General: She is not in acute distress.     Appearance: Normal appearance.   Pulmonary:      Effort: Pulmonary effort is normal.   Skin:     Comments: No obvious hair loss. Unable to pull a significant amount of hair from the scalp.   Neurological:      General: No focal deficit present.      Mental Status: She is alert.   Psychiatric:         Mood and Affect: Mood normal.         Behavior: Behavior normal.         Judgment: Judgment normal.             Assessment & Plan:     33 y.o. female with the following -     1. Hair loss  Chronic, uncontrolled.  Patient has very thick hair.  On exam was only able to pull 2 single strands from the scalp.  No obvious patchy hair loss.  Checking labs and referring to dermatology.    - VITAMIN D,25 HYDROXY (DEFICIENCY); Future  - VITAMIN B12; Future  - Referral to Dermatology    2. Need for hepatitis C screening test    - HEP C VIRUS ANTIBODY; Future    3. Obesity (BMI 30-39.9)  Chronic, uncontrolled  Checking labs.    - CBC WITH DIFFERENTIAL; Future  - Comp Metabolic Panel; Future  - TSH WITH REFLEX TO FT4; Future  - " Lipid Profile; Future  - VITAMIN D,25 HYDROXY (DEFICIENCY); Future      Have labs drawn.  Refer to dermatology.           Return if symptoms worsen or fail to improve.    Please note that this dictation was created using voice recognition software. I have made every reasonable attempt to correct obvious errors, but I expect that there are errors of grammar and possibly content that I did not discover before finalizing the note.

## 2023-09-19 ENCOUNTER — HOSPITAL ENCOUNTER (OUTPATIENT)
Dept: LAB | Facility: MEDICAL CENTER | Age: 33
End: 2023-09-19
Attending: PHYSICIAN ASSISTANT
Payer: COMMERCIAL

## 2023-09-19 DIAGNOSIS — Z11.59 NEED FOR HEPATITIS C SCREENING TEST: ICD-10-CM

## 2023-09-19 DIAGNOSIS — E66.9 OBESITY (BMI 30-39.9): ICD-10-CM

## 2023-09-19 DIAGNOSIS — L65.9 HAIR LOSS: ICD-10-CM

## 2023-09-19 LAB
25(OH)D3 SERPL-MCNC: 17 NG/ML (ref 30–100)
ALBUMIN SERPL BCP-MCNC: 4.2 G/DL (ref 3.2–4.9)
ALBUMIN SERPL BCP-MCNC: 4.3 G/DL (ref 3.2–4.9)
ALBUMIN/GLOB SERPL: 1.4 G/DL
ALBUMIN/GLOB SERPL: 1.4 G/DL
ALP SERPL-CCNC: 69 U/L (ref 30–99)
ALP SERPL-CCNC: 70 U/L (ref 30–99)
ALT SERPL-CCNC: 17 U/L (ref 2–50)
ALT SERPL-CCNC: 18 U/L (ref 2–50)
ANION GAP SERPL CALC-SCNC: 10 MMOL/L (ref 7–16)
ANION GAP SERPL CALC-SCNC: 10 MMOL/L (ref 7–16)
ANISOCYTOSIS BLD QL SMEAR: ABNORMAL
ANISOCYTOSIS BLD QL SMEAR: ABNORMAL
AST SERPL-CCNC: 21 U/L (ref 12–45)
AST SERPL-CCNC: 21 U/L (ref 12–45)
BASOPHILS # BLD AUTO: 1 % (ref 0–1.8)
BASOPHILS # BLD AUTO: 1.2 % (ref 0–1.8)
BASOPHILS # BLD: 0.06 K/UL (ref 0–0.12)
BASOPHILS # BLD: 0.07 K/UL (ref 0–0.12)
BILIRUB SERPL-MCNC: 0.2 MG/DL (ref 0.1–1.5)
BILIRUB SERPL-MCNC: 0.2 MG/DL (ref 0.1–1.5)
BUN SERPL-MCNC: 10 MG/DL (ref 8–22)
BUN SERPL-MCNC: 10 MG/DL (ref 8–22)
BURR CELLS BLD QL SMEAR: NORMAL
CALCIUM ALBUM COR SERPL-MCNC: 8.8 MG/DL (ref 8.5–10.5)
CALCIUM ALBUM COR SERPL-MCNC: 8.8 MG/DL (ref 8.5–10.5)
CALCIUM SERPL-MCNC: 9 MG/DL (ref 8.5–10.5)
CALCIUM SERPL-MCNC: 9 MG/DL (ref 8.5–10.5)
CHLORIDE SERPL-SCNC: 107 MMOL/L (ref 96–112)
CHLORIDE SERPL-SCNC: 109 MMOL/L (ref 96–112)
CHOLEST SERPL-MCNC: 180 MG/DL (ref 100–199)
CO2 SERPL-SCNC: 23 MMOL/L (ref 20–33)
CO2 SERPL-SCNC: 24 MMOL/L (ref 20–33)
COMMENT 1642: NORMAL
COMMENT 1642: NORMAL
CREAT SERPL-MCNC: 0.66 MG/DL (ref 0.5–1.4)
CREAT SERPL-MCNC: 0.66 MG/DL (ref 0.5–1.4)
EOSINOPHIL # BLD AUTO: 0.2 K/UL (ref 0–0.51)
EOSINOPHIL # BLD AUTO: 0.21 K/UL (ref 0–0.51)
EOSINOPHIL NFR BLD: 3.3 % (ref 0–6.9)
EOSINOPHIL NFR BLD: 3.5 % (ref 0–6.9)
ERYTHROCYTE [DISTWIDTH] IN BLOOD BY AUTOMATED COUNT: 42.8 FL (ref 35.9–50)
ERYTHROCYTE [DISTWIDTH] IN BLOOD BY AUTOMATED COUNT: 43.2 FL (ref 35.9–50)
ERYTHROCYTE [SEDIMENTATION RATE] IN BLOOD BY WESTERGREN METHOD: 8 MM/HOUR (ref 0–25)
FASTING STATUS PATIENT QL REPORTED: NORMAL
FERRITIN SERPL-MCNC: 5.4 NG/ML (ref 10–291)
FSH SERPL-ACNC: 7 MIU/ML
GFR SERPLBLD CREATININE-BSD FMLA CKD-EPI: 118 ML/MIN/1.73 M 2
GFR SERPLBLD CREATININE-BSD FMLA CKD-EPI: 118 ML/MIN/1.73 M 2
GLOBULIN SER CALC-MCNC: 3.1 G/DL (ref 1.9–3.5)
GLOBULIN SER CALC-MCNC: 3.1 G/DL (ref 1.9–3.5)
GLUCOSE SERPL-MCNC: 80 MG/DL (ref 65–99)
GLUCOSE SERPL-MCNC: 81 MG/DL (ref 65–99)
HCT VFR BLD AUTO: 35.9 % (ref 37–47)
HCT VFR BLD AUTO: 36.2 % (ref 37–47)
HCV AB SER QL: NORMAL
HDLC SERPL-MCNC: 70 MG/DL
HGB BLD-MCNC: 10.5 G/DL (ref 12–16)
HGB BLD-MCNC: 10.7 G/DL (ref 12–16)
HYPOCHROMIA BLD QL SMEAR: ABNORMAL
HYPOCHROMIA BLD QL SMEAR: ABNORMAL
IMM GRANULOCYTES # BLD AUTO: 0.01 K/UL (ref 0–0.11)
IMM GRANULOCYTES # BLD AUTO: 0.02 K/UL (ref 0–0.11)
IMM GRANULOCYTES NFR BLD AUTO: 0.2 % (ref 0–0.9)
IMM GRANULOCYTES NFR BLD AUTO: 0.3 % (ref 0–0.9)
IRON SERPL-MCNC: 16 UG/DL (ref 40–170)
LDLC SERPL CALC-MCNC: 92 MG/DL
LH SERPL-ACNC: 13 IU/L
LYMPHOCYTES # BLD AUTO: 2.97 K/UL (ref 1–4.8)
LYMPHOCYTES # BLD AUTO: 3 K/UL (ref 1–4.8)
LYMPHOCYTES NFR BLD: 49.3 % (ref 22–41)
LYMPHOCYTES NFR BLD: 49.7 % (ref 22–41)
MCH RBC QN AUTO: 20.6 PG (ref 27–33)
MCH RBC QN AUTO: 21 PG (ref 27–33)
MCHC RBC AUTO-ENTMCNC: 29.2 G/DL (ref 32.2–35.5)
MCHC RBC AUTO-ENTMCNC: 29.6 G/DL (ref 32.2–35.5)
MCV RBC AUTO: 70.4 FL (ref 81.4–97.8)
MCV RBC AUTO: 71 FL (ref 81.4–97.8)
MICROCYTES BLD QL SMEAR: ABNORMAL
MICROCYTES BLD QL SMEAR: ABNORMAL
MONOCYTES # BLD AUTO: 0.36 K/UL (ref 0–0.85)
MONOCYTES # BLD AUTO: 0.38 K/UL (ref 0–0.85)
MONOCYTES NFR BLD AUTO: 6 % (ref 0–13.4)
MONOCYTES NFR BLD AUTO: 6.3 % (ref 0–13.4)
MORPHOLOGY BLD-IMP: NORMAL
MORPHOLOGY BLD-IMP: NORMAL
NEUTROPHILS # BLD AUTO: 2.38 K/UL (ref 1.82–7.42)
NEUTROPHILS # BLD AUTO: 2.4 K/UL (ref 1.82–7.42)
NEUTROPHILS NFR BLD: 39.3 % (ref 44–72)
NEUTROPHILS NFR BLD: 39.9 % (ref 44–72)
NRBC # BLD AUTO: 0 K/UL
NRBC # BLD AUTO: 0 K/UL
NRBC BLD-RTO: 0 /100 WBC (ref 0–0.2)
NRBC BLD-RTO: 0 /100 WBC (ref 0–0.2)
OVALOCYTES BLD QL SMEAR: NORMAL
PLATELET # BLD AUTO: 391 K/UL (ref 164–446)
PLATELET # BLD AUTO: 397 K/UL (ref 164–446)
PLATELET BLD QL SMEAR: NORMAL
PLATELET BLD QL SMEAR: NORMAL
PMV BLD AUTO: 11.5 FL (ref 9–12.9)
PMV BLD AUTO: 11.8 FL (ref 9–12.9)
POIKILOCYTOSIS BLD QL SMEAR: NORMAL
POIKILOCYTOSIS BLD QL SMEAR: NORMAL
POTASSIUM SERPL-SCNC: 4.1 MMOL/L (ref 3.6–5.5)
POTASSIUM SERPL-SCNC: 4.2 MMOL/L (ref 3.6–5.5)
PROT SERPL-MCNC: 7.3 G/DL (ref 6–8.2)
PROT SERPL-MCNC: 7.4 G/DL (ref 6–8.2)
RBC # BLD AUTO: 5.1 M/UL (ref 4.2–5.4)
RBC # BLD AUTO: 5.1 M/UL (ref 4.2–5.4)
RBC BLD AUTO: PRESENT
RBC BLD AUTO: PRESENT
SODIUM SERPL-SCNC: 141 MMOL/L (ref 135–145)
SODIUM SERPL-SCNC: 142 MMOL/L (ref 135–145)
T PALLIDUM AB SER QL IA: NORMAL
TRIGL SERPL-MCNC: 88 MG/DL (ref 0–149)
TSH SERPL DL<=0.005 MIU/L-ACNC: 1.66 UIU/ML (ref 0.38–5.33)
TSH SERPL DL<=0.005 MIU/L-ACNC: 1.67 UIU/ML (ref 0.38–5.33)
VIT B12 SERPL-MCNC: 634 PG/ML (ref 211–911)
WBC # BLD AUTO: 6 K/UL (ref 4.8–10.8)
WBC # BLD AUTO: 6 K/UL (ref 4.8–10.8)

## 2023-09-19 PROCEDURE — 83001 ASSAY OF GONADOTROPIN (FSH): CPT

## 2023-09-19 PROCEDURE — 82607 VITAMIN B-12: CPT

## 2023-09-19 PROCEDURE — 84403 ASSAY OF TOTAL TESTOSTERONE: CPT

## 2023-09-19 PROCEDURE — 36415 COLL VENOUS BLD VENIPUNCTURE: CPT

## 2023-09-19 PROCEDURE — 80053 COMPREHEN METABOLIC PANEL: CPT

## 2023-09-19 PROCEDURE — 82306 VITAMIN D 25 HYDROXY: CPT

## 2023-09-19 PROCEDURE — 84270 ASSAY OF SEX HORMONE GLOBUL: CPT

## 2023-09-19 PROCEDURE — 86038 ANTINUCLEAR ANTIBODIES: CPT

## 2023-09-19 PROCEDURE — 86803 HEPATITIS C AB TEST: CPT

## 2023-09-19 PROCEDURE — 80061 LIPID PANEL: CPT

## 2023-09-19 PROCEDURE — 80053 COMPREHEN METABOLIC PANEL: CPT | Mod: 91

## 2023-09-19 PROCEDURE — 82728 ASSAY OF FERRITIN: CPT

## 2023-09-19 PROCEDURE — 85025 COMPLETE CBC W/AUTO DIFF WBC: CPT

## 2023-09-19 PROCEDURE — 85025 COMPLETE CBC W/AUTO DIFF WBC: CPT | Mod: 91

## 2023-09-19 PROCEDURE — 84443 ASSAY THYROID STIM HORMONE: CPT

## 2023-09-19 PROCEDURE — 86780 TREPONEMA PALLIDUM: CPT

## 2023-09-19 PROCEDURE — 84443 ASSAY THYROID STIM HORMONE: CPT | Mod: 91

## 2023-09-19 PROCEDURE — 83540 ASSAY OF IRON: CPT

## 2023-09-19 PROCEDURE — 85652 RBC SED RATE AUTOMATED: CPT

## 2023-09-19 PROCEDURE — 84402 ASSAY OF FREE TESTOSTERONE: CPT

## 2023-09-19 PROCEDURE — 82626 DEHYDROEPIANDROSTERONE: CPT

## 2023-09-19 PROCEDURE — 83002 ASSAY OF GONADOTROPIN (LH): CPT

## 2023-09-20 DIAGNOSIS — D50.9 IRON DEFICIENCY ANEMIA, UNSPECIFIED IRON DEFICIENCY ANEMIA TYPE: ICD-10-CM

## 2023-09-20 DIAGNOSIS — E55.9 VITAMIN D DEFICIENCY: ICD-10-CM

## 2023-09-20 RX ORDER — ERGOCALCIFEROL 1.25 MG/1
50000 CAPSULE ORAL
Qty: 12 CAPSULE | Refills: 1 | Status: SHIPPED | OUTPATIENT
Start: 2023-09-20

## 2023-09-20 RX ORDER — FERROUS SULFATE 325(65) MG
325 TABLET ORAL DAILY
Qty: 90 TABLET | Refills: 1 | Status: SHIPPED | OUTPATIENT
Start: 2023-09-20

## 2023-09-21 LAB — NUCLEAR IGG SER QL IA: NORMAL

## 2023-09-23 LAB
DHEA SERPL-MCNC: 1.69 NG/ML (ref 1.33–7.78)
SHBG SERPL-SCNC: 41 NMOL/L (ref 25–122)
TESTOST FREE SERPL-MCNC: 3.7 PG/ML (ref 1.3–9.2)
TESTOST SERPL-MCNC: 25 NG/DL (ref 9–55)

## 2024-11-05 ENCOUNTER — OFFICE VISIT (OUTPATIENT)
Dept: MEDICAL GROUP | Facility: PHYSICIAN GROUP | Age: 34
End: 2024-11-05
Payer: COMMERCIAL

## 2024-11-05 VITALS
TEMPERATURE: 98.2 F | HEIGHT: 65 IN | WEIGHT: 221 LBS | HEART RATE: 95 BPM | OXYGEN SATURATION: 98 % | BODY MASS INDEX: 36.82 KG/M2 | RESPIRATION RATE: 12 BRPM | SYSTOLIC BLOOD PRESSURE: 122 MMHG | DIASTOLIC BLOOD PRESSURE: 68 MMHG

## 2024-11-05 DIAGNOSIS — Z13.0 SCREENING FOR ENDOCRINE, METABOLIC AND IMMUNITY DISORDER: ICD-10-CM

## 2024-11-05 DIAGNOSIS — R14.0 BLOATING: ICD-10-CM

## 2024-11-05 DIAGNOSIS — Z13.29 SCREENING FOR ENDOCRINE, METABOLIC AND IMMUNITY DISORDER: ICD-10-CM

## 2024-11-05 DIAGNOSIS — Z13.228 SCREENING FOR ENDOCRINE, METABOLIC AND IMMUNITY DISORDER: ICD-10-CM

## 2024-11-05 DIAGNOSIS — K62.3 RECTAL PROLAPSE: ICD-10-CM

## 2024-11-05 DIAGNOSIS — E55.9 VITAMIN D DEFICIENCY: ICD-10-CM

## 2024-11-05 DIAGNOSIS — M79.89 LEG SWELLING: ICD-10-CM

## 2024-11-05 DIAGNOSIS — Z12.4 CERVICAL CANCER SCREENING: ICD-10-CM

## 2024-11-05 PROCEDURE — 99214 OFFICE O/P EST MOD 30 MIN: CPT | Performed by: PHYSICIAN ASSISTANT

## 2024-11-05 PROCEDURE — 3074F SYST BP LT 130 MM HG: CPT | Performed by: PHYSICIAN ASSISTANT

## 2024-11-05 PROCEDURE — 3078F DIAST BP <80 MM HG: CPT | Performed by: PHYSICIAN ASSISTANT

## 2024-11-05 ASSESSMENT — FIBROSIS 4 INDEX: FIB4 SCORE: 0.42

## 2024-11-05 ASSESSMENT — PATIENT HEALTH QUESTIONNAIRE - PHQ9: CLINICAL INTERPRETATION OF PHQ2 SCORE: 0

## 2024-11-05 NOTE — PROGRESS NOTES
Verbal consent was acquired by the patient to use [employer] ambient listening note generation during this visit     Subjective:     HPI:   History of Present Illness  The patient is a 34-year-old female here to discuss intermittent foot swelling.    She experiences intermittent foot swelling, particularly on workdays. Her job as a  requires her to be on her feet for 3 to 4 days a week, during which her feet swell significantly by the end of the day. This has been a new development over the past 2 weeks, causing discomfort and difficulty in walking. The swelling subsides after a night's rest but returns upon resuming work. She has not been working since Sunday and has not experienced any swelling during this period. She is due to return to work this Thursday. She wears steel-toed shoes at work and does not use compression socks.    She has been experiencing a sensation of pressure in her lower abdomen, similar to the feeling of being pregnant, although she is not. This sensation was particularly noticeable before her last menstrual period, accompanied by tightness in her lower back. She also felt bloated yesterday. Her menstrual cycle remains regular and she reports no abdominal pain. She has not been eating much this week and reports no urinary symptoms such as burning during urination.     Additionally, she reports a sensation of something protruding from her rectum during bowel movements, which she can push back in. This has been occurring for the past 2 months. She has had 3 children and her last Pap smear was conducted after her last childbirth.    Health Maintenance: Completed    ROS:  Gen: no fevers/chills  Eyes: no changes in vision  ENT: no sore throat  Pulm: no sob, no cough  CV: no chest pain  GI: no nausea/vomiting  : no dysuria  MSk: no myalgias  Skin: no rash  Neuro: no headaches    Objective:     Exam:  /68   Pulse 95   Temp 36.8 °C (98.2 °F) (Temporal)   Resp 12   Ht  "1.651 m (5' 5\")   Wt 100 kg (221 lb)   SpO2 98%   Breastfeeding No   BMI 36.78 kg/m²  Body mass index is 36.78 kg/m².    PHYSICAL EXAM  Constitutional: Alert, no distress, well-groomed.  Skin: Warm, dry, good turgor, no rashes in visible areas.  Eye: Equal, round and reactive, conjunctiva clear, lids normal.  ENMT: Lips without lesions, good dentition, moist mucous membranes.  Neck: Trachea midline, no masses, no thyromegaly.  Respiratory: Unlabored respiratory effort, no cough.  MSK: Normal gait, moves all extremities.  Neuro: Grossly non-focal.   Psych: Alert and oriented x3, normal affect and mood.    Results      Assessment & Plan:     1. Vitamin D deficiency  VITAMIN D,25 HYDROXY (DEFICIENCY)      2. Screening for endocrine, metabolic and immunity disorder  Comp Metabolic Panel    Lipid Profile    TSH WITH REFLEX TO FT4    HEMOGLOBIN A1C    HCG QUAL SERUM    CBC WITHOUT DIFFERENTIAL      3. Bloating  US-PELVIC COMPLETE (TRANSABDOMINAL/TRANSVAGINAL) (COMBO)      4. Rectal prolapse  Referral to Gynecology    Referral to Physical Therapy      5. Cervical cancer screening  Referral to Gynecology          Assessment & Plan          1. Leg swelling  Chronic, intermittent. The swelling is likely due to prolonged standing at work, as her symptoms resolve on her days off and after sleep.  She was advised to wear knee-high compression socks during work hours to help redistribute the fluid. Elevating her feet while sitting and reducing salt intake were also recommended.  Did discuss further evaluation if not improving.  Patient will follow-up for new or worsening concerns.       2. Vitamin D deficiency  - VITAMIN D,25 HYDROXY (DEFICIENCY); Future    3. Screening for endocrine, metabolic and immunity disorder  - Comp Metabolic Panel; Future  - Lipid Profile; Future  - TSH WITH REFLEX TO FT4; Future  - HEMOGLOBIN A1C; Future  - HCG QUAL SERUM; Future  - CBC WITHOUT DIFFERENTIAL; Future    4. Bloating  Chronic, " intermittent.  Patient reports very low abdominal pressure and bloating so gynecological ultrasound ordered to further evaluate.  Did discuss if normal can further evaluate with other imaging.  Will determine follow-up based on the results.  - US-PELVIC COMPLETE (TRANSABDOMINAL/TRANSVAGINAL) (COMBO); Future    5. Rectal prolapse  The sensation of something protruding from her rectum during bowel movements suggests a possible rectal prolapse.  Recommended full evaluation from gynecology and pelvic floor physical therapy to evaluate for prolapse and to determine further interventions if necessary. She was advised to maintain hydration and increase fiber intake to prevent constipation.  - Referral to Gynecology  - Referral to Physical Therapy    6. Cervical cancer screening  - Referral to Gynecology    I spent a total of 32 minutes with record review, exam, communication with the patient, communication with other providers, and documentation of this encounter.    Please note that this dictation was created using voice recognition software. I have made every reasonable attempt to correct obvious errors, but I expect that there are errors of grammar and possibly content that I did not discover before finalizing the note.

## 2024-11-06 ENCOUNTER — HOSPITAL ENCOUNTER (OUTPATIENT)
Dept: LAB | Facility: MEDICAL CENTER | Age: 34
End: 2024-11-06
Attending: PHYSICIAN ASSISTANT
Payer: COMMERCIAL

## 2024-11-06 DIAGNOSIS — Z13.0 SCREENING FOR ENDOCRINE, METABOLIC AND IMMUNITY DISORDER: ICD-10-CM

## 2024-11-06 DIAGNOSIS — Z13.228 SCREENING FOR ENDOCRINE, METABOLIC AND IMMUNITY DISORDER: ICD-10-CM

## 2024-11-06 DIAGNOSIS — E55.9 VITAMIN D DEFICIENCY: ICD-10-CM

## 2024-11-06 DIAGNOSIS — Z13.29 SCREENING FOR ENDOCRINE, METABOLIC AND IMMUNITY DISORDER: ICD-10-CM

## 2024-11-06 LAB
25(OH)D3 SERPL-MCNC: 18 NG/ML (ref 30–100)
ALBUMIN SERPL BCP-MCNC: 4.3 G/DL (ref 3.2–4.9)
ALBUMIN/GLOB SERPL: 1.3 G/DL
ALP SERPL-CCNC: 75 U/L (ref 30–99)
ALT SERPL-CCNC: 53 U/L (ref 2–50)
ANION GAP SERPL CALC-SCNC: 15 MMOL/L (ref 7–16)
AST SERPL-CCNC: 57 U/L (ref 12–45)
BILIRUB SERPL-MCNC: 0.4 MG/DL (ref 0.1–1.5)
BUN SERPL-MCNC: 7 MG/DL (ref 8–22)
CALCIUM ALBUM COR SERPL-MCNC: 9.3 MG/DL (ref 8.5–10.5)
CALCIUM SERPL-MCNC: 9.5 MG/DL (ref 8.5–10.5)
CHLORIDE SERPL-SCNC: 102 MMOL/L (ref 96–112)
CHOLEST SERPL-MCNC: 169 MG/DL (ref 100–199)
CO2 SERPL-SCNC: 23 MMOL/L (ref 20–33)
CREAT SERPL-MCNC: 0.57 MG/DL (ref 0.5–1.4)
ERYTHROCYTE [DISTWIDTH] IN BLOOD BY AUTOMATED COUNT: 42.7 FL (ref 35.9–50)
EST. AVERAGE GLUCOSE BLD GHB EST-MCNC: 114 MG/DL
FASTING STATUS PATIENT QL REPORTED: NORMAL
GFR SERPLBLD CREATININE-BSD FMLA CKD-EPI: 122 ML/MIN/1.73 M 2
GLOBULIN SER CALC-MCNC: 3.4 G/DL (ref 1.9–3.5)
GLUCOSE SERPL-MCNC: 93 MG/DL (ref 65–99)
HBA1C MFR BLD: 5.6 % (ref 4–5.6)
HCG SERPL QL: NEGATIVE
HCT VFR BLD AUTO: 36.8 % (ref 37–47)
HDLC SERPL-MCNC: 67 MG/DL
HGB BLD-MCNC: 11.2 G/DL (ref 12–16)
LDLC SERPL CALC-MCNC: 84 MG/DL
MCH RBC QN AUTO: 22.2 PG (ref 27–33)
MCHC RBC AUTO-ENTMCNC: 30.4 G/DL (ref 32.2–35.5)
MCV RBC AUTO: 73 FL (ref 81.4–97.8)
PLATELET # BLD AUTO: 351 K/UL (ref 164–446)
PMV BLD AUTO: 11.3 FL (ref 9–12.9)
POTASSIUM SERPL-SCNC: 3.7 MMOL/L (ref 3.6–5.5)
PROT SERPL-MCNC: 7.7 G/DL (ref 6–8.2)
RBC # BLD AUTO: 5.04 M/UL (ref 4.2–5.4)
SODIUM SERPL-SCNC: 140 MMOL/L (ref 135–145)
TRIGL SERPL-MCNC: 92 MG/DL (ref 0–149)
TSH SERPL DL<=0.005 MIU/L-ACNC: 1.5 UIU/ML (ref 0.38–5.33)
WBC # BLD AUTO: 7.2 K/UL (ref 4.8–10.8)

## 2024-11-06 PROCEDURE — 83036 HEMOGLOBIN GLYCOSYLATED A1C: CPT

## 2024-11-06 PROCEDURE — 84443 ASSAY THYROID STIM HORMONE: CPT

## 2024-11-06 PROCEDURE — 36415 COLL VENOUS BLD VENIPUNCTURE: CPT

## 2024-11-06 PROCEDURE — 84703 CHORIONIC GONADOTROPIN ASSAY: CPT

## 2024-11-06 PROCEDURE — 85027 COMPLETE CBC AUTOMATED: CPT

## 2024-11-06 PROCEDURE — 82306 VITAMIN D 25 HYDROXY: CPT

## 2024-11-06 PROCEDURE — 80053 COMPREHEN METABOLIC PANEL: CPT

## 2024-11-06 PROCEDURE — 80061 LIPID PANEL: CPT

## 2024-11-23 ENCOUNTER — OFFICE VISIT (OUTPATIENT)
Dept: URGENT CARE | Facility: PHYSICIAN GROUP | Age: 34
End: 2024-11-23
Payer: COMMERCIAL

## 2024-11-23 ENCOUNTER — HOSPITAL ENCOUNTER (OUTPATIENT)
Dept: LAB | Facility: MEDICAL CENTER | Age: 34
End: 2024-11-23
Attending: NURSE PRACTITIONER
Payer: COMMERCIAL

## 2024-11-23 ENCOUNTER — APPOINTMENT (OUTPATIENT)
Dept: LAB | Facility: MEDICAL CENTER | Age: 34
End: 2024-11-23
Payer: COMMERCIAL

## 2024-11-23 VITALS
HEIGHT: 65 IN | TEMPERATURE: 97.8 F | RESPIRATION RATE: 16 BRPM | SYSTOLIC BLOOD PRESSURE: 108 MMHG | WEIGHT: 225.09 LBS | BODY MASS INDEX: 37.5 KG/M2 | OXYGEN SATURATION: 95 % | HEART RATE: 83 BPM | DIASTOLIC BLOOD PRESSURE: 82 MMHG

## 2024-11-23 DIAGNOSIS — D50.9 IRON DEFICIENCY ANEMIA, UNSPECIFIED IRON DEFICIENCY ANEMIA TYPE: ICD-10-CM

## 2024-11-23 DIAGNOSIS — R14.0 BLOATING SYMPTOM: ICD-10-CM

## 2024-11-23 LAB
FERRITIN SERPL-MCNC: 13.7 NG/ML (ref 10–291)
IRON SATN MFR SERPL: 7 % (ref 15–55)
IRON SERPL-MCNC: 27 UG/DL (ref 40–170)
TIBC SERPL-MCNC: 415 UG/DL (ref 250–450)
UIBC SERPL-MCNC: 388 UG/DL (ref 110–370)

## 2024-11-23 PROCEDURE — 82728 ASSAY OF FERRITIN: CPT

## 2024-11-23 PROCEDURE — 83550 IRON BINDING TEST: CPT

## 2024-11-23 PROCEDURE — 36415 COLL VENOUS BLD VENIPUNCTURE: CPT

## 2024-11-23 PROCEDURE — 99214 OFFICE O/P EST MOD 30 MIN: CPT | Performed by: NURSE PRACTITIONER

## 2024-11-23 PROCEDURE — 3074F SYST BP LT 130 MM HG: CPT | Performed by: NURSE PRACTITIONER

## 2024-11-23 PROCEDURE — 83540 ASSAY OF IRON: CPT

## 2024-11-23 PROCEDURE — 3079F DIAST BP 80-89 MM HG: CPT | Performed by: NURSE PRACTITIONER

## 2024-11-23 ASSESSMENT — FIBROSIS 4 INDEX: FIB4 SCORE: 0.76

## 2024-11-23 ASSESSMENT — ENCOUNTER SYMPTOMS
DIARRHEA: 0
SORE THROAT: 0
HEADACHES: 0
CHILLS: 0
MYALGIAS: 0
DIZZINESS: 0
COUGH: 0
NAUSEA: 0
FEVER: 0
VOMITING: 0
CONSTIPATION: 0

## 2024-11-23 NOTE — PROGRESS NOTES
Subjective     Renetta Nichols is a 34 y.o. female who presents with Bloated (Fatigue x 1 week )            HPI  New problem.  Patient is a 34-year-old female who presents with bloating and fatigue x 1 week.  She apparently saw her primary care on the fifth with similar symptoms and had labs ordered which were all within normal limits with the exception of elevated liver enzymes and low H&H.  She has had a running low H&H for the past 3 years that no one seems to have addressed per her report.  She did not know she had anemia.  She states she has had a history of heavy periods and her hemoglobin most recent is at 11.2 which is considerably better than what it has been.  She also reports feeling bloated in her epigastric area.  She has had no nausea, vomiting, diarrhea, or constipation issues.  She denies fever, chills, myalgia, or headache.  She does endorse occasional episodes of heartburn.  She has not had follow-up with primary care on this lab work.    Patient has no known allergies.  No current outpatient medications on file prior to visit.     No current facility-administered medications on file prior to visit.     Social History     Socioeconomic History    Marital status:      Spouse name: Not on file    Number of children: Not on file    Years of education: Not on file    Highest education level: Some college, no degree   Occupational History    Not on file   Tobacco Use    Smoking status: Never    Smokeless tobacco: Never   Vaping Use    Vaping status: Never Used   Substance and Sexual Activity    Alcohol use: Not Currently    Drug use: No    Sexual activity: Yes     Partners: Male   Other Topics Concern    Not on file   Social History Narrative    Not on file     Social Drivers of Health     Financial Resource Strain: Not on file   Food Insecurity: No Food Insecurity (12/16/2021)    Hunger Vital Sign     Worried About Running Out of Food in the Last Year: Never true     Ran Out of Food in the Last  "Year: Never true   Transportation Needs: No Transportation Needs (12/16/2021)    PRAPARE - Transportation     Lack of Transportation (Medical): No     Lack of Transportation (Non-Medical): No   Physical Activity: Sufficiently Active (12/16/2021)    Exercise Vital Sign     Days of Exercise per Week: 5 days     Minutes of Exercise per Session: 60 min   Stress: No Stress Concern Present (12/16/2021)    Citizen of [place] [employer] [employer] - Occupational Stress Questionnaire     Feeling of Stress : Not at all   Social Connections: Moderately Integrated (12/16/2021)    Social Connection and Isolation Panel [NHANES]     Frequency of Communication with Friends and Family: More than three times a week     Frequency of Social Gatherings with Friends and Family: Twice a week     Attends Zoroastrian Services: More than 4 times per year     Active Member of Clubs or Organizations: No     Attends Club or Organization Meetings: Never     Marital Status:    Intimate Partner Violence: Not on file   Housing Stability: Low Risk  (12/16/2021)    Housing Stability Vital Sign     Unable to Pay for Housing in the Last Year: No     Number of Places Lived in the Last Year: 1     Unstable Housing in the Last Year: No     Breast Cancer-related family history is not on file.      Review of Systems   Constitutional:  Positive for malaise/fatigue. Negative for chills and fever.   HENT:  Negative for congestion and sore throat.    Respiratory:  Negative for cough.    Gastrointestinal:  Negative for constipation, diarrhea, nausea and vomiting.        +abdominal bloating.   Musculoskeletal:  Negative for myalgias.   Neurological:  Negative for dizziness and headaches.   All other systems reviewed and are negative.             Objective     /82   Pulse 83   Temp 36.6 °C (97.8 °F) (Temporal)   Resp 16   Ht 1.651 m (5' 5\")   Wt 102 kg (225 lb 1.4 oz)   SpO2 95%   BMI 37.46 kg/m²      Physical Exam  Vitals and nursing note " reviewed.   Constitutional:       General: She is not in acute distress.     Appearance: Normal appearance. She is well-developed.   HENT:      Head: Normocephalic.      Nose: Mucosal edema present.   Cardiovascular:      Rate and Rhythm: Normal rate and regular rhythm.      Heart sounds: Normal heart sounds.   Abdominal:      General: Abdomen is flat.      Palpations: Abdomen is soft.      Tenderness: There is no abdominal tenderness.   Musculoskeletal:         General: Normal range of motion.      Cervical back: Normal range of motion and neck supple.   Lymphadenopathy:      Cervical: No cervical adenopathy.   Skin:     General: Skin is warm and dry.   Neurological:      General: No focal deficit present.      Mental Status: She is alert and oriented to person, place, and time.   Psychiatric:         Behavior: Behavior normal.         Thought Content: Thought content normal.                             Assessment & Plan        Assessment & Plan  Bloating symptom    Orders:    H. PYLORI BREATH TEST    Iron deficiency anemia, unspecified iron deficiency anemia type    Orders:    IRON/TOTAL IRON BIND; Future    FERRITIN; Future    omeprazole (PRILOSEC) 20 MG delayed-release capsule; Take 1 Capsule by mouth every day.  will call with results.  Trial of omeprazole.  She will need follow up with PCP.  Differential diagnosis, natural history, supportive care, and indications for immediate follow-up were discussed.

## 2024-11-23 NOTE — ASSESSMENT & PLAN NOTE
Orders:    IRON/TOTAL IRON BIND; Future    FERRITIN; Future    omeprazole (PRILOSEC) 20 MG delayed-release capsule; Take 1 Capsule by mouth every day.  will call with results.  Trial of omeprazole.  She will need follow up with PCP.  Differential diagnosis, natural history, supportive care, and indications for immediate follow-up were discussed.

## 2024-11-23 NOTE — LETTER
November 23, 2024        Renetta Simone  [address] [place] [address]        Renetta was seen in our clinic today and she is excused from work. Thank you.    If you have any questions or concerns, please don't hesitate to call.        Sincerely,        MEIR Barth.    Electronically Signed

## 2024-11-25 ENCOUNTER — HOSPITAL ENCOUNTER (OUTPATIENT)
Dept: LAB | Facility: MEDICAL CENTER | Age: 34
End: 2024-11-25
Attending: NURSE PRACTITIONER
Payer: COMMERCIAL

## 2024-11-25 LAB — UREA BREATH TEST QL: NEGATIVE

## 2024-11-25 PROCEDURE — 83013 H PYLORI (C-13) BREATH: CPT

## 2024-12-16 ENCOUNTER — HOSPITAL ENCOUNTER (OUTPATIENT)
Dept: RADIOLOGY | Facility: MEDICAL CENTER | Age: 34
End: 2024-12-16
Attending: PHYSICIAN ASSISTANT
Payer: COMMERCIAL

## 2024-12-16 DIAGNOSIS — R14.0 BLOATING: ICD-10-CM

## 2024-12-16 PROCEDURE — 76830 TRANSVAGINAL US NON-OB: CPT

## 2025-01-14 ENCOUNTER — OFFICE VISIT (OUTPATIENT)
Dept: MEDICAL GROUP | Facility: PHYSICIAN GROUP | Age: 35
End: 2025-01-14
Payer: COMMERCIAL

## 2025-01-14 VITALS
OXYGEN SATURATION: 96 % | RESPIRATION RATE: 18 BRPM | TEMPERATURE: 97.3 F | HEIGHT: 65 IN | SYSTOLIC BLOOD PRESSURE: 108 MMHG | BODY MASS INDEX: 35.16 KG/M2 | DIASTOLIC BLOOD PRESSURE: 64 MMHG | HEART RATE: 78 BPM | WEIGHT: 211 LBS

## 2025-01-14 DIAGNOSIS — R74.8 ELEVATED LIVER ENZYMES: ICD-10-CM

## 2025-01-14 DIAGNOSIS — D50.0 IRON DEFICIENCY ANEMIA DUE TO CHRONIC BLOOD LOSS: ICD-10-CM

## 2025-01-14 DIAGNOSIS — E55.9 VITAMIN D DEFICIENCY: ICD-10-CM

## 2025-01-14 PROCEDURE — 3078F DIAST BP <80 MM HG: CPT | Performed by: PHYSICIAN ASSISTANT

## 2025-01-14 PROCEDURE — 3074F SYST BP LT 130 MM HG: CPT | Performed by: PHYSICIAN ASSISTANT

## 2025-01-14 PROCEDURE — 99214 OFFICE O/P EST MOD 30 MIN: CPT | Performed by: PHYSICIAN ASSISTANT

## 2025-01-14 ASSESSMENT — PATIENT HEALTH QUESTIONNAIRE - PHQ9: CLINICAL INTERPRETATION OF PHQ2 SCORE: 0

## 2025-01-14 ASSESSMENT — FIBROSIS 4 INDEX: FIB4 SCORE: 0.78

## 2025-01-14 NOTE — PATIENT INSTRUCTIONS
Repeat labs in 3-6 months (fasting)    Your vitamin D level came back low at 18.  The normal level is above 30, but I prefer to be at least 50-60.  Vitamin D is an important vitamin that helps with energy, bone strength and our immune system.  It also helps our body utilize glucose more efficiently. I would like you to start supplementing with 4,000 units of vitamin D3 daily.  You can get Vitamin D3 over the counter at any drug store.  In addition, I would also like you to start supplementing with 1,200 mg of calcium daily.  Vitamin D is important to help your body absorb calcium properly, so because yours was low I would like you start supplementing with both the calcium and Vitamin D3 to ensure we keep your bones strong.     Lowering cholesterol/fats:  - Eat leaner cuts of meat, or eliminate altogether if possible red meat, and frequently substitute fish or chicken.  - Limit saturated fat to no more than 7-10% of total calories no more than 10 g per day is recommended. Some sources of saturated fat include butter, animal fats, hydrogenated vegetable fats and oils, many desserts, whole milk dairy products.  - Replaced saturated fats with polyunsaturated fats and monounsaturated fats. Foods high in monounsaturated fat include nuts, although well, canola oil, avocados, and olives.  - Limit trans fat (processed foods) and replaced with fresh fruits and vegetables  - Recommend nonfat dairy products  - Increase substantially the amount of soluble fiber intake (legumes such as beans, fruit, whole grains).  - Consider nutritional supplements: plant sterile spreads such as Benecol, fish oil,  flaxseed oil, omega-3 acids capsules 1000 mg twice a day, or viscous fiber such as Metamucil  - Attain ideal weight and regular exercise (at least 30 minutes per day of walking)

## 2025-01-14 NOTE — PROGRESS NOTES
"Verbal consent was acquired by the patient to use [employer] ambient listening note generation during this visit     Subjective:     HPI:   History of Present Illness  The patient is a 35-year-old female who presents for a follow-up on lab results.    She sought medical attention at an urgent care facility due to severe gastrointestinal symptoms, including abdominal hardness and bloating, which have since resolved. She underwent testing for H. pylori, which returned negative.    She has a long-standing history of heavy menstrual bleeding, which she considers her baseline. She recently consulted with her gynecologist, who recommended birth control, a suggestion she declined. Over the past 2 to 3 months, her menstrual cycle has normalized, with less bleeding. She has had 2 regular cycles since her last study in November 2024. She is uncertain about future childbearing plans. She is currently taking multivitamins with iron.    She admits to consuming fast food and sweets but is making efforts to improve her diet. She does not frequently use Tylenol and abstains from alcohol.    SOCIAL HISTORY  She does not drink alcohol.    MEDICATIONS  Current: multivitamins with iron    Health Maintenance: Completed    ROS:  Gen: no fevers/chills  Eyes: no changes in vision  ENT: no sore throat  Pulm: no sob, no cough  CV: no chest pain  GI: no nausea/vomiting  : no dysuria  MSk: no myalgias  Skin: no rash  Neuro: no headaches  Psych: no depression, no anxiety    Objective:     Exam:  /64   Pulse 78   Temp 36.3 °C (97.3 °F) (Temporal)   Resp 18   Ht 1.651 m (5' 5\")   Wt 95.7 kg (211 lb)   SpO2 96%   BMI 35.11 kg/m²  Body mass index is 35.11 kg/m².    PHYSICAL EXAM  Constitutional: Alert, no distress, well-groomed.  Skin: Warm, dry, good turgor, no rashes in visible areas.  Eye: Equal, round and reactive, conjunctiva clear, lids normal.  ENMT: Lips without lesions, good dentition, moist mucous membranes.  Neck: Trachea " midline, no masses, no thyromegaly.  Respiratory: Unlabored respiratory effort, no cough.  MSK: Normal gait, moves all extremities.  Neuro: Grossly non-focal.   Psych: Alert and oriented x3, normal affect and mood.    Results  Laboratory Studies reviewed and discussed with patient     Assessment & Plan:     1. Elevated liver enzymes  Comp Metabolic Panel      2. Iron deficiency anemia due to chronic blood loss  CBC WITHOUT DIFFERENTIAL    IRON/TOTAL IRON BIND    FERRITIN      3. Vitamin D deficiency          Assessment & Plan  1. Iron Deficiency Anemia.  Chronic, not well-controlled.  Her iron levels remain decreased, She has a history of heavy menstrual bleeding, which has recently normalized over the past 2 to 3 months.  She had a pelvic ultrasound completed about a month ago that showed possible adenomyosis.  She was evaluated by gynecology last week and they recommended possible OCPs, ever the patient is possibly considering having another child so she declined this.  At this time she is tahira monitor her menstrual cycle and will follow-up with gynecology of the blood clots recur.  She is currently taking multivitamins with iron. . A re-evaluation of her blood cells will be conducted in 3 to 6 months.     2. Vitamin D Deficiency.  Chronic, not well-controlled.  Her vitamin D levels are persistently low. She is advised to supplement her diet with 4000 international units of D3 and 1200 mg of calcium.    3. Elevated Liver Enzymes.  New diagnosis, uncertain etiology.  Her liver enzymes are mildly elevated. This could potentially indicate the onset of fatty liver disease. She is advised to reduce her intake of fast food and to adopt a healthier diet. A re-evaluation of her liver enzymes will be conducted in 3 to 6 months. She is also advised to avoid excessive use of Tylenol and alcohol. If the liver enzymes remain elevated, we will proceed with further workup.        I spent a total of 25 minutes with record review,  exam, communication with the patient, communication with other providers, and documentation of this encounter.    Please note that this dictation was created using voice recognition software. I have made every reasonable attempt to correct obvious errors, but I expect that there are errors of grammar and possibly content that I did not discover before finalizing the note.

## 2025-05-20 ENCOUNTER — HOSPITAL ENCOUNTER (OUTPATIENT)
Facility: MEDICAL CENTER | Age: 35
End: 2025-05-20
Attending: STUDENT IN AN ORGANIZED HEALTH CARE EDUCATION/TRAINING PROGRAM
Payer: COMMERCIAL

## 2025-05-20 ENCOUNTER — INITIAL PRENATAL (OUTPATIENT)
Dept: OBGYN | Facility: CLINIC | Age: 35
End: 2025-05-20
Attending: PHYSICIAN ASSISTANT
Payer: COMMERCIAL

## 2025-05-20 VITALS
SYSTOLIC BLOOD PRESSURE: 116 MMHG | WEIGHT: 211 LBS | BODY MASS INDEX: 35.16 KG/M2 | HEIGHT: 65 IN | DIASTOLIC BLOOD PRESSURE: 74 MMHG

## 2025-05-20 DIAGNOSIS — Z12.4 SCREENING FOR MALIGNANT NEOPLASM OF CERVIX: ICD-10-CM

## 2025-05-20 DIAGNOSIS — Z34.80 PRENATAL CARE, SUBSEQUENT PREGNANCY, ANTEPARTUM: ICD-10-CM

## 2025-05-20 DIAGNOSIS — E66.9 OBESITY (BMI 30-39.9): ICD-10-CM

## 2025-05-20 DIAGNOSIS — O09.529 ANTEPARTUM MULTIGRAVIDA OF ADVANCED MATERNAL AGE: Primary | ICD-10-CM

## 2025-05-20 LAB
CANDIDA DNA VAG QL PROBE+SIG AMP: NEGATIVE
G VAGINALIS DNA VAG QL PROBE+SIG AMP: NEGATIVE
T VAGINALIS DNA VAG QL PROBE+SIG AMP: NEGATIVE

## 2025-05-20 PROCEDURE — 87660 TRICHOMONAS VAGIN DIR PROBE: CPT

## 2025-05-20 PROCEDURE — 87510 GARDNER VAG DNA DIR PROBE: CPT

## 2025-05-20 PROCEDURE — 3074F SYST BP LT 130 MM HG: CPT | Performed by: STUDENT IN AN ORGANIZED HEALTH CARE EDUCATION/TRAINING PROGRAM

## 2025-05-20 PROCEDURE — 87480 CANDIDA DNA DIR PROBE: CPT

## 2025-05-20 PROCEDURE — 0501F PRENATAL FLOW SHEET: CPT | Performed by: STUDENT IN AN ORGANIZED HEALTH CARE EDUCATION/TRAINING PROGRAM

## 2025-05-20 PROCEDURE — 88142 CYTOPATH C/V THIN LAYER: CPT

## 2025-05-20 PROCEDURE — 3078F DIAST BP <80 MM HG: CPT | Performed by: STUDENT IN AN ORGANIZED HEALTH CARE EDUCATION/TRAINING PROGRAM

## 2025-05-20 PROCEDURE — 87591 N.GONORRHOEAE DNA AMP PROB: CPT

## 2025-05-20 PROCEDURE — 87491 CHLMYD TRACH DNA AMP PROBE: CPT

## 2025-05-20 ASSESSMENT — EDINBURGH POSTNATAL DEPRESSION SCALE (EPDS)
I HAVE BEEN SO UNHAPPY THAT I HAVE HAD DIFFICULTY SLEEPING: NOT AT ALL
I HAVE BEEN ABLE TO LAUGH AND SEE THE FUNNY SIDE OF THINGS: AS MUCH AS I ALWAYS COULD
I HAVE BEEN ANXIOUS OR WORRIED FOR NO GOOD REASON: HARDLY EVER
I HAVE BLAMED MYSELF UNNECESSARILY WHEN THINGS WENT WRONG: NOT VERY OFTEN
TOTAL SCORE: 4
I HAVE BEEN SO UNHAPPY THAT I HAVE BEEN CRYING: ONLY OCCASIONALLY
I HAVE FELT SAD OR MISERABLE: NO, NOT AT ALL
I HAVE LOOKED FORWARD WITH ENJOYMENT TO THINGS: AS MUCH AS I EVER DID
I HAVE FELT SCARED OR PANICKY FOR NO GOOD REASON: NO, NOT MUCH
THINGS HAVE BEEN GETTING ON TOP OF ME: NO, I HAVE BEEN COPING AS WELL AS EVER
THE THOUGHT OF HARMING MYSELF HAS OCCURRED TO ME: NEVER

## 2025-05-20 ASSESSMENT — FIBROSIS 4 INDEX: FIB4 SCORE: 0.78

## 2025-05-20 NOTE — PROGRESS NOTES
NOB today   First prenatal care appt  Pt. States nausea, tired and vomiting   Pharmacy verified  Phone # verified   Pregnancy not planned but desired  EPDS 4  FOB-  Walker employment at [employer]  Pt employed full time [employer]  NIPT declined   Last PAP:  2017 (WNL)     Pt reported unsure of lmp

## 2025-05-20 NOTE — ASSESSMENT & PLAN NOTE
Pre-gravid BMI: 35.11  Goal weight gain: 11-20 lbs    [ ] NST's weekly starting @ 37 wga  [ ] Delivery timing: no recommendation     Orders:    Referral to Perinatology

## 2025-05-20 NOTE — ASSESSMENT & PLAN NOTE
35 y.o.  dated by LMP **  - Entered [facility] at 10w1d wga  - Final ANA: 12/15/2025**    Screening:  [X] ASA indicated (< 16 wks): pk yes no ASA: Yes, Rx provided  [X] Early DM screening indicated? - No  [X]  depression (EPDS score(s)): 4  [X] H/o genital HSV? no  [X] Varicella immunity by: unsure if she received the vaccine or had chicken pox**    Labs and routine testing  [X] 1T labs: ordered today  [ ] ABO/RH: **, Ab screen neg**  [X] Pap: collected today  [X] Genetic screening: DECLINED NIPT/Carrier screening-verbalized risk  [ ] AFP (15-20 wks): **  [ ] GDM (24-28 wks) screening   [ ] 1 hr GTT: **    [ ] 3hr GTT: **  [ ] 3T labs (28+ wks): Hgb: **/ plts **/HIV **/syphilis **  [ ] GBS (35 wks) **      Immunizations:   [ ] tdap (27+ wks) -   [ ] flu-    [ ] RSV (32-36 wks)-   [ ] Rhogam -     Ultrasound(s):   [ ] Anatomy US: **    Delivery Plan:   [X] Planned mode of delivery: vaginal  [ ] BTL consent:      [ ] Fetal presentation (35-36 w):   [ ] Timing of delivery:   [X] Accepting of transfusion: Yes    Postpartum:  [ ] PPBCM: **     [ ] considering BTL? (Med 178 signed): **  [ ] PP Vaccinations needed: **  [ ] Breat Pump      Orders:    CBC WITHOUT DIFFERENTIAL; Future    T.PALLIDUM AB WILLIAM (SCREENING); Future    OP Prenatal Panel-Blood Bank; Future    VARICELLA ZOSTER IGG AB; Future    HIV AG/AB COMBO ASSAY SCREENING; Future    RUBELLA ABS IGG; Future    HEP B SURFACE ANTIGEN; Future    HEP C VIRUS ANTIBODY; Future    URINE CULTURE(NEW); Future    HEMOGLOBIN A1C; Future    US-OB 1ST TRIMESTER WITH TRANSVAGINAL (COMBO); Future    THINPREP PAP W/HPV AND CTNG; Future    VAGINAL PATHOGENS DNA PANEL; Future

## 2025-05-20 NOTE — PROGRESS NOTES
"Establish Pregnancy Visit    CC: First OB Visit    HPI: Patient is a 35 y.o.  at 10w1d who presents for her first OB visit.    Patient's last menstrual period was 03/10/2025. giving her an Estimated Date of Delivery: 12/15/25.  However, per  he believes that her last menstrual cycle was end of January/February. She does note that her menses are typically regular q 28 days, lasting up to 5 days.    She is  and states that she did not have any complications in her previous pregnancies. All her deliveries were vaginal. They did not require vacuum, forceps, etc.     Patient is feeling \"fluttering.\"   Patient denies vaginal bleeding, reports nausea, denies vomiting.  Currently is not taking anything fo the nausea. Intermittently has been experiencing headaches. Pt denies urinary symptoms.  Occasionally has noticed vaginal discharge    Pt reports nausea and intermittent headaches at this time.   ER visits or previous care in this pregnancy: Declines.     FOB is Abel and involved in pregnancy  Pregnancy is NOT planned, but desired.    She is currently working at [employer]. Pt states she does not do any heavy lifting.     Pre-eclampsia Risk Factors:   MODERATE RISK FACTORS:, BMI > 30, and Advanced maternal age (>35 at time of delivery)    Gestational Diabetes Risk Factors:   BMI >25 and  and High risk ethnicity (, , ,  American, )      GYN HX:   Last Pap:   Hx Moderate or Severe Dysplasia : no  Hx STD : no    OBSTETRIC HISTORY:  OB History    Para Term  AB Living   4 3 3   3   SAB IAB Ectopic Molar Multiple Live Births       0 3      # Outcome Date GA Lbr Ashok/2nd Weight Sex Type Anes PTL Lv   4 Current            3 Term 21 39w2d / 00:01 7 lb 3.2 oz M Vag-Spont None N CAITLIN   2 Term 18   7 lb 1.2 oz M Non-Spontane  N CAITLIN   1 Term 16   6 lb 3 oz M Non-Spontane  N CAITLIN       Prior pregnancy complications: no .   Prior " caesarian deliveries: Denies.    No history of preeclampsia, gestational diabetes, macrosomia, fetal growth restriction or low birthweight baby, or genetic abnormalities.   No history of  deliveries, stillborn, or multiples.     MEDICAL HISTORY:  Past Medical History[1]    Significant medical history: Denies .     Denies history of asthma, thyroid disease, HSV, PCOS, diabetes or glucose impairment, renal disease, hypertension, hyperlipidemia, autoimmune diseases (lupus or antiphospholipid antibody syndrome), sleep apnea. No history of transfusions.     Would accept blood transfusion    Psych History   Depression/anxiety:  Denies  Bipolar   Denies  Postpartum Mood Changes Denies    MEDICATIONS:  Medications Ordered Prior to Encounter[2]    Current Medications:   Patient is taking prenatal vitamin.  Significant medications: Denies.     FAMILY HISTORY:  Family History   Problem Relation Age of Onset    No Known Problems Mother     No Known Problems Father     No Known Problems Sister     No Known Problems Brother     Diabetes Paternal Aunt        Denies Family history of genetic disorders (cystic fibrosis, SMA, Fragile X, Balwinder-sachs), hemoglobinopathies (thalassemia, Sickle cell), preeclampsia, birth defects, developmental delays, diabetes, cardiovascular disease, cancers.     SURGICAL HISTORY:  Past Surgical History[3]    Denies history of pelvic or bariatric surgery     ALLERGIES / REACTIONS:  Allergies[4]             SOCIAL HISTORY:   reports that she has never smoked. She has never used smokeless tobacco. She reports that she does not currently use alcohol. She reports that she does not use drugs.    Denies current or hx of sexual, emotional or physical abuse or trauma.   Trauma may prevent vaginal exam or vaginal birth: Denies    Tobacco use: Denies  Alcohol use: Denies  Marijuana use: denies  Drug use: Denies    ROS:   Gen: no fevers or chills, no significant weight loss or gain, excessive  "fatigue  Respiratory:  no cough or dyspnea  Cardiac:  no chest pain, no palpitations, no syncope  Breast: no breast discharge, pain, lump or skin changes  GI:  no heartburn, no abdominal pain, no nausea or vomiting  Urinary: no dysuria, urgency, frequency, incontinence   Psych: no depression or anxiety  Neuro: no migraines with aura, fainting spells, numbness or tingling  Extremities: no joint pain, persistently swollen ankles, recurrent leg cramps         PHYSICAL EXAMINATION:  Vital Signs: /74   Ht 5' 5\"   Wt 211 lb   LMP 03/10/2025   BMI 35.11 kg/m²    Constitutional: The patient is well developed and well nourished.  Psychiatric: Patient is oriented to time place and person.   Skin: No rash observed.  Neck: Neck appears symmetric. There are no masses or adenopathy present.  Respiratory: normal effort  Abdomen: Soft, non-tender.  Pelvic:    Vulva: normal.    Urethra: normal.   Vagina: normal.    Cervix: normal.    Uterus: consistent with dates    Adnexa: normal.   Perineum: normal.   GC / Chlamydia cultures obtained.   Pap Smear Obtained: yes   Chaperone present: Kaylyn Woodruff MA  Extremeties: Legs are symmetric and without tenderness. There is no edema present.    EPDS Score: 4    ACOG SCREENING  Infection Prevention  1. High Risk For HIV: No 6. Rash Or Illness Since LMP: No     2. High Risk For Hepatitis B or C: No 7. History Of STD, GC, Chlamydia, HPV Syphilis: No     3. Live With Someone With TB Or Exposed To TB: No 8. Have a cat in the home?: No     4. Patient Or Partner Has A History Of Herpes: No      5. History of Chicken Pox: No 9. Other (See Comments Below): No            Genetic Screening/Teratology Counseling- Includes patient, baby's father, or anyone in either family with:  Patient's age 35 years or older as of estimated date of delivery: No     Thalassemia (Italian, Greek, Mediterranean, or  background): MCV less than 80: No     Neural tube defect (Meningomyelocele, Spina " bifida, or Anencephaly): No     Congenital heart defect: No     Down syndrome: No     Balwinder-Sachs (Ashkenazi Anabaptism, Cajun, Amharic Wilkin): No     Canavan disease (Ashkenazi Anabaptism): No     Familial dysautonomia (Ashkenazi Anabaptism): No     Sickle cell disease or trait (): No     Hemophilia or other blood disorders: No     Muscular dystrophy: No    Cystic fibrosis: No     Jaquelin's chorea: No     Mental retardation/autism: No     Other inherited genetic or chromosomal disorder: No     Maternal metabolic disorder (eg. Type 1 diabetes, PKU): No     Patient or baby's father had child with birth defects not listed above: No     Recurrent pregnancy loss, or a stillbirth: No     Medications (including supplements, vitamins, herbs, or OTC drugs)/illicit/recreational drugs/alcohol since last menstrual period: No                 ASSESSMENT AND PLAN:  35 y.o.  at 10w1d      Assessment & Plan  Antepartum multigravida of advanced maternal age  [X] ASA indicated, Rx provided fro PIH risk reduction  [ ] discussed increased risk of aneuploidy, and screening and diagnostic options for testing-->pt declined NIPT/carrier screening here today  [ ] discussed option for  surveillance@ 37 weeks and IOL at 39-39.6 is if desired per ACOG guidelines     Orders:    Referral to Perinatology    Screening for malignant neoplasm of cervix  Pap collected with MA at bedside  Orders:    THINPREP PAP W/HPV AND CTNG; Future    Prenatal care, subsequent pregnancy, antepartum    35 y.o.  dated by LMP **  - Entered PNC at 10w1d wga  - Final ANA: 12/15/2025**    Screening:  [X] ASA indicated (< 16 wks): pk yes no ASA: Yes, Rx provided  [X] Early DM screening indicated? - No  [X]  depression (EPDS score(s)): 4  [X] H/o genital HSV? no  [X] Varicella immunity by: unsure if she received the vaccine or had chicken pox**    Labs and routine testing  [X] 1T labs: ordered today  [ ] ABO/RH: **, Ab screen neg**  [X] Pap:  collected today  [X] Genetic screening: DECLINED NIPT/Carrier screening-verbalized risk  [ ] AFP (15-20 wks): **  [ ] GDM (24-28 wks) screening   [ ] 1 hr GTT: **    [ ] 3hr GTT: **  [ ] 3T labs (28+ wks): Hgb: **/ plts **/HIV **/syphilis **  [ ] GBS (35 wks) **      Immunizations:   [ ] tdap (27+ wks) -   [ ] flu-    [ ] RSV (32-36 wks)-   [ ] Rhogam -     Ultrasound(s):   [ ] Anatomy US: **    Delivery Plan:   [X] Planned mode of delivery: vaginal  [ ] BTL consent:      [ ] Fetal presentation (35-36 w):   [ ] Timing of delivery:   [X] Accepting of transfusion: Yes    Postpartum:  [ ] PPBCM: **     [ ] considering BTL? (Med 178 signed): **  [ ] PP Vaccinations needed: **  [ ] Breat Pump      Orders:    CBC WITHOUT DIFFERENTIAL; Future    T.PALLIDUM AB WILLIAM (SCREENING); Future    OP Prenatal Panel-Blood Bank; Future    VARICELLA ZOSTER IGG AB; Future    HIV AG/AB COMBO ASSAY SCREENING; Future    RUBELLA ABS IGG; Future    HEP B SURFACE ANTIGEN; Future    HEP C VIRUS ANTIBODY; Future    URINE CULTURE(NEW); Future    HEMOGLOBIN A1C; Future    US-OB 1ST TRIMESTER WITH TRANSVAGINAL (COMBO); Future    THINPREP PAP W/HPV AND CTNG; Future    VAGINAL PATHOGENS DNA PANEL; Future    Obesity (BMI 30-39.9)  Pre-gravid BMI: 35.11  Goal weight gain: 11-20 lbs    [ ] NST's weekly starting @ 37 wga  [ ] Delivery timing: no recommendation     Orders:    Referral to Perinatology        - Pregnancy risk factor counseling   - Start ASA after 12 weeks: yes  - Early 1 hour GTT ordered: not indicated   - Baseline PIH labs: not indicated   - Hemoglobin electrophoresis ordered: not indicated   - BMI: >30, Obesity counseling indicated   - Planned delivery method: vaginal  - MFM referral: Advanced maternal age   - Weight gain goal: BMI >/= 30: 11-20 lbs      # Counseling    - PNV: Recommended continuing or starting PNV (pill form) if not already taking  - Diet: Increase water intake and encouraged healthy nutrition, reviewed recommended  changes to diet in pregnancy.  - Vaccines: Discussed recommendation and timing for flu, Covid, RSV, TDaP vaccine during pregnancy.   - Exercise: Reviewed that moderate exercise into the 3rd trimester is associated with improved outcomes. Any contact sports, extreme activities, or that requires more than moderate exertion should be discussed before undertaking.   - Office policies: Discussed office policies and group practice model, prenatal care timeline, weight gain, diet and activity. Pregnancy packet provided.   - Reviewed indications to seek emergency care:including heavy bleeding, LOF, of severe abdominal pain.      Return in 4 weeks for New OB consult with physician       Zenobia Quinones P.A.-C.  [facility]          [1]   Past Medical History:  Diagnosis Date    Pregnant     RESOLVED/DELIVERED   [2]   Current Outpatient Medications on File Prior to Visit   Medication Sig Dispense Refill    omeprazole (PRILOSEC) 20 MG delayed-release capsule Take 1 Capsule by mouth every day. (Patient not taking: Reported on 1/14/2025) 30 Capsule 2     No current facility-administered medications on file prior to visit.   [3] History reviewed. No pertinent surgical history.  [4] No Known Allergies

## 2025-05-20 NOTE — ASSESSMENT & PLAN NOTE
[X] ASA indicated, Rx provided fro PIH risk reduction  [ ] discussed increased risk of aneuploidy, and screening and diagnostic options for testing-->pt declined NIPT/carrier screening here today  [ ] discussed option for  surveillance@ 37 weeks and IOL at 39-39.6 is if desired per ACOG guidelines     Orders:    Referral to Perinatology

## 2025-05-21 ENCOUNTER — HOSPITAL ENCOUNTER (OUTPATIENT)
Dept: LAB | Facility: MEDICAL CENTER | Age: 35
End: 2025-05-21
Attending: STUDENT IN AN ORGANIZED HEALTH CARE EDUCATION/TRAINING PROGRAM
Payer: COMMERCIAL

## 2025-05-21 ENCOUNTER — RESULTS FOLLOW-UP (OUTPATIENT)
Dept: OBGYN | Facility: CLINIC | Age: 35
End: 2025-05-21

## 2025-05-21 DIAGNOSIS — Z34.80 PRENATAL CARE, SUBSEQUENT PREGNANCY, ANTEPARTUM: ICD-10-CM

## 2025-05-21 LAB
ABO GROUP BLD: NORMAL
BLD GP AB SCN SERPL QL: NORMAL
C TRACH DNA GENITAL QL NAA+PROBE: NEGATIVE
EST. AVERAGE GLUCOSE BLD GHB EST-MCNC: 111 MG/DL
HBA1C MFR BLD: 5.5 % (ref 4–5.6)
N GONORRHOEA DNA GENITAL QL NAA+PROBE: NEGATIVE
RH BLD: NORMAL
SPECIMEN SOURCE: NORMAL

## 2025-05-21 PROCEDURE — 86900 BLOOD TYPING SEROLOGIC ABO: CPT

## 2025-05-21 PROCEDURE — 36415 COLL VENOUS BLD VENIPUNCTURE: CPT

## 2025-05-21 PROCEDURE — 86803 HEPATITIS C AB TEST: CPT

## 2025-05-21 PROCEDURE — 87340 HEPATITIS B SURFACE AG IA: CPT

## 2025-05-21 PROCEDURE — 86850 RBC ANTIBODY SCREEN: CPT

## 2025-05-21 PROCEDURE — 86901 BLOOD TYPING SEROLOGIC RH(D): CPT

## 2025-05-21 PROCEDURE — 86787 VARICELLA-ZOSTER ANTIBODY: CPT

## 2025-05-21 PROCEDURE — 86780 TREPONEMA PALLIDUM: CPT

## 2025-05-21 PROCEDURE — 87389 HIV-1 AG W/HIV-1&-2 AB AG IA: CPT

## 2025-05-21 PROCEDURE — 86762 RUBELLA ANTIBODY: CPT

## 2025-05-21 PROCEDURE — 83036 HEMOGLOBIN GLYCOSYLATED A1C: CPT

## 2025-05-21 PROCEDURE — 87086 URINE CULTURE/COLONY COUNT: CPT

## 2025-05-21 RX ORDER — ASPIRIN 81 MG/1
81 TABLET, CHEWABLE ORAL DAILY
Qty: 100 TABLET | Refills: 1 | Status: SHIPPED | OUTPATIENT
Start: 2025-05-21

## 2025-05-22 LAB
HBV SURFACE AG SER QL: NORMAL
HCV AB SER QL: NORMAL
HIV 1+2 AB+HIV1 P24 AG SERPL QL IA: NORMAL
RUBV AB SER QL: 44 IU/ML
T PALLIDUM AB SER QL IA: NORMAL

## 2025-05-23 ENCOUNTER — HOSPITAL ENCOUNTER (OUTPATIENT)
Dept: RADIOLOGY | Facility: MEDICAL CENTER | Age: 35
End: 2025-05-23
Attending: STUDENT IN AN ORGANIZED HEALTH CARE EDUCATION/TRAINING PROGRAM
Payer: COMMERCIAL

## 2025-05-23 DIAGNOSIS — Z34.80 PRENATAL CARE, SUBSEQUENT PREGNANCY, ANTEPARTUM: ICD-10-CM

## 2025-05-23 LAB
BACTERIA UR CULT: NORMAL
SIGNIFICANT IND 70042: NORMAL
SITE SITE: NORMAL
SOURCE SOURCE: NORMAL

## 2025-05-23 PROCEDURE — 76817 TRANSVAGINAL US OBSTETRIC: CPT

## 2025-05-23 NOTE — Clinical Note
REFERRAL APPROVAL NOTICE         Sent on May 23, 2025                   Renetta Nichols  [address]  [place] [address]                   Dear Ms. Nichols,    After a careful review of the medical information and benefit coverage, [facility] has processed your referral. See below for additional details.    If applicable, you must be actively enrolled with your insurance for coverage of the authorized service. If you have any questions regarding your coverage, please contact your insurance directly.    REFERRAL INFORMATION   Referral #:  [id number]  Referred-To Department    Referred-By Provider:  Maternal Fetal Medicine    Zenobia Quinones P.A.-C.   Maternal Fetal Med      [address]  [address]  [place] [address]  [phone] [address], [address]  [place] [address]  [phone]    Referral Start Date:  05/20/2025  Referral End Date:   05/20/2026             SCHEDULING  If you do not already have an appointment, please call [phone] to make an appointment.     MORE INFORMATION  If you do not already have a [employer] account, sign up at: [email or web address]  You can access your medical information, make appointments, see lab results, billing information, and more.  If you have questions regarding this referral, please contact  the [facility] Referrals department at:             [phone]. Monday - Friday 8:00AM - 5:00PM.     Sincerely,    [facility]

## 2025-05-24 LAB — VZV IGG SER IA-ACNC: 2.81 S/CO

## 2025-05-28 ENCOUNTER — TELEPHONE (OUTPATIENT)
Dept: OBGYN | Facility: CLINIC | Age: 35
End: 2025-05-28
Payer: COMMERCIAL

## 2025-05-28 ENCOUNTER — TELEPHONE (OUTPATIENT)
Dept: MATERNAL FETAL MEDICINE | Facility: MEDICAL CENTER | Age: 35
End: 2025-05-28
Payer: COMMERCIAL

## 2025-05-28 NOTE — TELEPHONE ENCOUNTER
9:50 AM      Called patient and discussed that her US showed that she was 2 weeks further along than her LMP. Discussed that we will be updating this on the computer and her new due date will be 12/1/2025.    Her and her  did go back and look at her menstrual history and realized that she was about 2 weeks off.     All of her questions and concerns were answered.     Zenobia Quinones P.A.-C.

## 2025-05-28 NOTE — TELEPHONE ENCOUNTER
Called patient to schedule debra for detailed/AMA-Consult. [place] she is 17-21 weeks. Soonest patient is able to come in is on 08/05/25. Patient has an debra to come in to see M @ 1500.

## 2025-05-31 LAB
HPV I/H RISK 1 DNA SPEC QL NAA+PROBE: NOT DETECTED
SPECIMEN SOURCE: NORMAL
THINPREP PAP, CYTOLOGY NL11781: NORMAL

## 2025-06-15 NOTE — ASSESSMENT & PLAN NOTE
[X] ASA indicated, Rx provided fro PIH risk reduction  [X] Panorama ordered   [ ] discussed option for  surveillance@ 37 weeks and IOL at 39-39.6 is if desired per ACOG guidelines

## 2025-06-15 NOTE — ASSESSMENT & PLAN NOTE
Pre-gravid BMI: 35.11  Goal weight gain: 11-20 lbs    [ ] NST's weekly starting @ 37 wga  [ ] Delivery timing: no recommendation

## 2025-06-15 NOTE — PROGRESS NOTES
[facility]OM[facility]  RETURN OB VISIT    S: Pt is a 35 y.o.  at 15w5d gestation here today for routine prenatal care.     Pregnancy complicated by:  Problem List[1]    Denies concerns today. Denies contractions, loss of fluid, and vaginal bleeding.  She will be gone on a trip to [place] from  until .    O: BP 96/63   Wt 208 lb   BMI 34.61 kg/m²    *see prenatal flowsheet*     Physical Exam:  Gen: no acute distress  Pulm: easy work of breathing on room air  Abd: gravid, nontender    A/P: 35 y.o.  with IUP at 15w5d who presents for return OB visit    Prenatal care, subsequent pregnancy, antepartum  35 y.o.  dated by 12wk US  - Entered PNC at 10w1d wga  - Final ANA: 2025    Screening:  [X] ASA indicated (< 16 wks): Recommended initiation  [X] Early DM screening indicated? - No  [X]  depression (EPDS score(s)): 4  [X] H/o genital HSV? no  [X] Varicella immunity by: unsure if she received the vaccine or had chicken pox    Labs and routine testing  [X] 1T labs: wnl  [X] ABO/RH: O+, Ab screen neg  [X] Pap: NILM, HPV neg (2025)  [X] Genetic screening: Accepted [facility] today  [X] AFP (15-20 wks): declined  [ ] GDM (24-28 wks) screening   [ ] 1 hr GTT: **    [ ] 3hr GTT: **  [ ] 3T labs (28+ wks): Hgb: **/ plts **/HIV **/syphilis **  [ ] GBS (35 wks) **      Immunizations:   [ ] tdap (27+ wks) -   [ ] flu-    [ ] RSV (32-36 wks)-     Ultrasound(s):   [ ] Anatomy US: scheduled with [facility]     Delivery Plan:   [X] Planned mode of delivery: vaginal  [X] Declines permanent sterilization  [ ] Fetal presentation (35-36 w):   [ ] Timing of delivery:   [X] Accepting of transfusion: Yes    Postpartum:  [ ] PPBCM: partner vascetomy  [ ] PP Vaccinations needed: **  [ ] Breat Pump      Antepartum multigravida of advanced maternal age  [X] ASA indicated, Rx provided fro PIH risk reduction  [X] Panorama ordered   [ ] discussed option for  surveillance@ 37 weeks and  IOL at 39-39.6 is if desired per ACOG guidelines     Obesity (BMI 30-39.9)  Pre-gravid BMI: 35.11  Goal weight gain: 11-20 lbs    [ ] NST's weekly starting @ 37 wga  [ ] Delivery timing: no recommendation     Plan  - Discussed travel precautions during pregnancy including recommendation of compression stockings and frequent mobilization during periods of long sitting.  - Recommended initiation of aspirin daily  - Panorama ordered  - Declined AFP  - MFM consult scheduled August 5 ROB 4wks - recommended she schedule appointment prior to traveling to [place]    Juanyisabel Albert M.D.         [1]   Patient Active Problem List  Diagnosis    Obesity (BMI 30-39.9)    Loss of appetite    Irregular menses    Hair loss    Vitamin D deficiency    Iron deficiency anemia    Antepartum multigravida of advanced maternal age    Prenatal care, subsequent pregnancy, antepartum    Screening for malignant neoplasm of cervix

## 2025-06-15 NOTE — ASSESSMENT & PLAN NOTE
35 y.o.  dated by 12wk US  - Entered PNC at 10w1d wga  - Final ANA: 2025    Screening:  [X] ASA indicated (< 16 wks): Recommended initiation  [X] Early DM screening indicated? - No  [X]  depression (EPDS score(s)): 4  [X] H/o genital HSV? no  [X] Varicella immunity by: unsure if she received the vaccine or had chicken pox    Labs and routine testing  [X] 1T labs: wnl  [X] ABO/RH: O+, Ab screen neg  [X] Pap: NILM, HPV neg (2025)  [X] Genetic screening: Accepted [facility] today  [X] AFP (15-20 wks): declined  [ ] GDM (24-28 wks) screening   [ ] 1 hr GTT: **    [ ] 3hr GTT: **  [ ] 3T labs (28+ wks): Hgb: **/ plts **/HIV **/syphilis **  [ ] GBS (35 wks) **      Immunizations:   [ ] tdap (27+ wks) -   [ ] flu-    [ ] RSV (32-36 wks)-     Ultrasound(s):   [ ] Anatomy US: scheduled with [facility]     Delivery Plan:   [X] Planned mode of delivery: vaginal  [X] Declines permanent sterilization  [ ] Fetal presentation (35-36 w):   [ ] Timing of delivery:   [X] Accepting of transfusion: Yes    Postpartum:  [ ] PPBCM: partner vascetomy  [ ] PP Vaccinations needed: **  [ ] Breat Pump

## 2025-06-17 ENCOUNTER — HOSPITAL ENCOUNTER (OUTPATIENT)
Dept: LAB | Facility: MEDICAL CENTER | Age: 35
End: 2025-06-17
Attending: STUDENT IN AN ORGANIZED HEALTH CARE EDUCATION/TRAINING PROGRAM
Payer: COMMERCIAL

## 2025-06-17 ENCOUNTER — ROUTINE PRENATAL (OUTPATIENT)
Dept: OBGYN | Facility: CLINIC | Age: 35
End: 2025-06-17
Payer: COMMERCIAL

## 2025-06-17 VITALS — BODY MASS INDEX: 34.61 KG/M2 | DIASTOLIC BLOOD PRESSURE: 63 MMHG | SYSTOLIC BLOOD PRESSURE: 96 MMHG | WEIGHT: 208 LBS

## 2025-06-17 DIAGNOSIS — E66.9 OBESITY (BMI 30-39.9): ICD-10-CM

## 2025-06-17 DIAGNOSIS — Z34.80 PRENATAL CARE, SUBSEQUENT PREGNANCY, ANTEPARTUM: Primary | ICD-10-CM

## 2025-06-17 DIAGNOSIS — O09.529 ANTEPARTUM MULTIGRAVIDA OF ADVANCED MATERNAL AGE: ICD-10-CM

## 2025-06-17 PROCEDURE — 3074F SYST BP LT 130 MM HG: CPT | Performed by: STUDENT IN AN ORGANIZED HEALTH CARE EDUCATION/TRAINING PROGRAM

## 2025-06-17 PROCEDURE — 0502F SUBSEQUENT PRENATAL CARE: CPT | Performed by: STUDENT IN AN ORGANIZED HEALTH CARE EDUCATION/TRAINING PROGRAM

## 2025-06-17 PROCEDURE — 36415 COLL VENOUS BLD VENIPUNCTURE: CPT

## 2025-06-17 PROCEDURE — 3078F DIAST BP <80 MM HG: CPT | Performed by: STUDENT IN AN ORGANIZED HEALTH CARE EDUCATION/TRAINING PROGRAM

## 2025-06-17 ASSESSMENT — FIBROSIS 4 INDEX: FIB4 SCORE: 0.78

## 2025-06-17 NOTE — PROGRESS NOTES
Pt here today for OBFV   +FM movemnet  No VB, LOF. Uc's   Phone number verified   Pharmacy verified   Nipt desired

## 2025-06-24 ENCOUNTER — RESULTS FOLLOW-UP (OUTPATIENT)
Dept: OBGYN | Facility: CLINIC | Age: 35
End: 2025-06-24
Payer: COMMERCIAL

## 2025-06-24 LAB
Lab: NORMAL
NTRA 1P36 DELETION SYNDROME POPULATION-BASED RISK TEXT: NORMAL
NTRA 1P36 DELETION SYNDROME RESULT TEXT: NORMAL
NTRA 1P36 DELETION SYNDROME RISK SCORE TEXT: NORMAL
NTRA 22Q11.2 DELETION SYNDROME POPULATION-BASED RISK TEXT: NORMAL
NTRA 22Q11.2 DELETION SYNDROME RESULT TEXT: NORMAL
NTRA 22Q11.2 DELETION SYNDROME RISK SCORE TEXT: NORMAL
NTRA ANGELMAN SYNDROME POPULATION-BASED RISK TEXT: NORMAL
NTRA ANGELMAN SYNDROME RESULT TEXT: NORMAL
NTRA ANGELMAN SYNDROME RISK SCORE TEXT: NORMAL
NTRA CRI-DU-CHAT SYNDROME POPULATION-BASED RISK TEXT: NORMAL
NTRA CRI-DU-CHAT SYNDROME RESULT TEXT: NORMAL
NTRA CRI-DU-CHAT SYNDROME RISK SCORE TEXT: NORMAL
NTRA FETAL FRACTION: NORMAL
NTRA GENDER OF FETUS: NORMAL
NTRA MONOSOMY X AGE-BASED RISK TEXT: NORMAL
NTRA MONOSOMY X RESULT TEXT: NORMAL
NTRA MONOSOMY X RISK SCORE TEXT: NORMAL
NTRA PRADER-WILLI SYNDROME POPULATION-BASED RISK TEXT: NORMAL
NTRA PRADER-WILLI SYNDROME RESULT TEXT: NORMAL
NTRA PRADER-WILLI SYNDROME RISK SCORE TEXT: NORMAL
NTRA TRIPLOIDY RESULT TEXT: NORMAL
NTRA TRISOMY 13 AGE-BASED RISK TEXT: NORMAL
NTRA TRISOMY 13 RESULT TEXT: NORMAL
NTRA TRISOMY 13 RISK SCORE TEXT: NORMAL
NTRA TRISOMY 18 AGE-BASED RISK TEXT: NORMAL
NTRA TRISOMY 18 RESULT TEXT: NORMAL
NTRA TRISOMY 18 RISK SCORE TEXT: NORMAL
NTRA TRISOMY 21 AGE-BASED RISK TEXT: NORMAL
NTRA TRISOMY 21 RESULT TEXT: NORMAL
NTRA TRISOMY 21 RISK SCORE TEXT: NORMAL

## 2025-07-08 ENCOUNTER — HOSPITAL ENCOUNTER (OUTPATIENT)
Dept: LAB | Facility: MEDICAL CENTER | Age: 35
End: 2025-07-08
Attending: STUDENT IN AN ORGANIZED HEALTH CARE EDUCATION/TRAINING PROGRAM
Payer: COMMERCIAL

## 2025-07-08 ENCOUNTER — ROUTINE PRENATAL (OUTPATIENT)
Dept: OBGYN | Facility: CLINIC | Age: 35
End: 2025-07-08
Payer: COMMERCIAL

## 2025-07-08 VITALS — DIASTOLIC BLOOD PRESSURE: 64 MMHG | SYSTOLIC BLOOD PRESSURE: 114 MMHG | WEIGHT: 213 LBS | BODY MASS INDEX: 35.45 KG/M2

## 2025-07-08 DIAGNOSIS — Z34.80 PRENATAL CARE, SUBSEQUENT PREGNANCY, ANTEPARTUM: ICD-10-CM

## 2025-07-08 DIAGNOSIS — O09.529 ANTEPARTUM MULTIGRAVIDA OF ADVANCED MATERNAL AGE: Primary | ICD-10-CM

## 2025-07-08 DIAGNOSIS — E66.9 OBESITY (BMI 30-39.9): ICD-10-CM

## 2025-07-08 PROBLEM — N92.6 IRREGULAR MENSES: Status: RESOLVED | Noted: 2021-04-26 | Resolved: 2025-07-08

## 2025-07-08 LAB
ERYTHROCYTE [DISTWIDTH] IN BLOOD BY AUTOMATED COUNT: 43.8 FL (ref 35.9–50)
HCT VFR BLD AUTO: 35 % (ref 37–47)
HGB BLD-MCNC: 11.6 G/DL (ref 12–16)
MCH RBC QN AUTO: 26.4 PG (ref 27–33)
MCHC RBC AUTO-ENTMCNC: 33.1 G/DL (ref 32.2–35.5)
MCV RBC AUTO: 79.7 FL (ref 81.4–97.8)
PLATELET # BLD AUTO: 303 K/UL (ref 164–446)
PMV BLD AUTO: 11.8 FL (ref 9–12.9)
RBC # BLD AUTO: 4.39 M/UL (ref 4.2–5.4)
WBC # BLD AUTO: 10.1 K/UL (ref 4.8–10.8)

## 2025-07-08 PROCEDURE — 3074F SYST BP LT 130 MM HG: CPT | Performed by: STUDENT IN AN ORGANIZED HEALTH CARE EDUCATION/TRAINING PROGRAM

## 2025-07-08 PROCEDURE — 3078F DIAST BP <80 MM HG: CPT | Performed by: STUDENT IN AN ORGANIZED HEALTH CARE EDUCATION/TRAINING PROGRAM

## 2025-07-08 PROCEDURE — 85027 COMPLETE CBC AUTOMATED: CPT

## 2025-07-08 PROCEDURE — 36415 COLL VENOUS BLD VENIPUNCTURE: CPT

## 2025-07-08 PROCEDURE — 0502F SUBSEQUENT PRENATAL CARE: CPT | Performed by: STUDENT IN AN ORGANIZED HEALTH CARE EDUCATION/TRAINING PROGRAM

## 2025-07-08 ASSESSMENT — FIBROSIS 4 INDEX: FIB4 SCORE: 0.78

## 2025-07-08 NOTE — PROGRESS NOTES
S: 35 y.o.  at 18w5d presents for routine obstetric follow-up.   Good fetal movement. Meeting fetal kick counts.   No frequent contractions, vaginal bleeding, leakage of fluid, or dysuria.  Denies headaches, vision changes, abd pain, swelling of hands/face.        O: /64   Wt 213 lb   BMI 35.45 kg/m²   Patients' weight gain, fluid intake and exercise level discussed.  Vitals, fundal height , fetal position, and FHR reviewed on flowsheet    Patient Active Problem List    Diagnosis Date Noted    Antepartum multigravida of advanced maternal age 2025    Prenatal care, subsequent pregnancy, antepartum 2025    Screening for malignant neoplasm of cervix 2025    Vitamin D deficiency 2023    Iron deficiency anemia 2023    Hair loss 05/10/2022    Loss of appetite 2021    Obesity (BMI 30-39.9) 2019        Lab:No results found for this or any previous visit (from the past 2 weeks).    Labs:  - Prenatal labs: Completed and normal. However, did not have CBC done.   - GS/GTT: order at next appt  - GBS: N/A  - Genetic testing: Low risk NIPT  - STI testing: negative   - Rh: O positive     Ultrasound:  - anatomy scan scheduled 2025     Vaccinations:  RSV: Educated on RSV vaccine at 32-36 wks from Sept-    Contraception:  - BTL: declines sterilization  - Post partum Contraception Plan: partner vasectomy     A/P:  35 y.o.  at 18w5d presents for routine obstetric follow-up.  Size equals dates and/or scan     Problem List Items Addressed This Visit          Women's Health Problems    Antepartum multigravida of advanced maternal age - Primary       Other    Obesity (BMI 30-39.9)      - has appt with [facility] 2025  - will get CBC done  - S/sx pregnancy, pre E, and labor warning signs vs general discomforts discussed  - Fetal movements and/or kick counts reviewed.   - Adequate hydration reinforced. Aim for drinking 8-12 cups (64-96 ounces) of water daily.   -  Nutrition/exercise/vitamin education; continue PNV  - Encouraged tour of LnD/childbirth education classes: contact info provided   - Anticipatory guidance given  - Patient was counseled to go to L&D for vaginal bleeding, regular contractions, leakage of fluid or decreased fetal movement.     Follow-up in 4 weeks with MD hamlet Quinones P.A.-C.  [facility]

## 2025-07-08 NOTE — PROGRESS NOTES
Ob/fu  Pt reports + fetal movement  Denies LOF, VB or UC's  Phone # verified  Pharmacy verified     Anatomy US scheduled 08/05/2025  CBC not done

## 2025-08-11 ENCOUNTER — APPOINTMENT (OUTPATIENT)
Dept: OBGYN | Facility: CLINIC | Age: 35
End: 2025-08-11
Payer: COMMERCIAL

## 2025-08-11 ASSESSMENT — FIBROSIS 4 INDEX: FIB4 SCORE: 0.9

## 2025-08-13 ENCOUNTER — APPOINTMENT (OUTPATIENT)
Dept: MATERNAL FETAL MEDICINE | Facility: MEDICAL CENTER | Age: 35
End: 2025-08-13
Payer: COMMERCIAL

## 2025-08-13 ASSESSMENT — FIBROSIS 4 INDEX: FIB4 SCORE: 0.9

## 2025-08-25 ENCOUNTER — TELEPHONE (OUTPATIENT)
Dept: MATERNAL FETAL MEDICINE | Facility: MEDICAL CENTER | Age: 35
End: 2025-08-25
Payer: COMMERCIAL

## 2025-08-26 ENCOUNTER — HOSPITAL ENCOUNTER (OUTPATIENT)
Dept: LAB | Facility: MEDICAL CENTER | Age: 35
End: 2025-08-26
Attending: OBSTETRICS & GYNECOLOGY
Payer: COMMERCIAL

## 2025-08-26 LAB
FERRITIN SERPL-MCNC: 6.8 NG/ML (ref 10–291)
IRON SATN MFR SERPL: 7 % (ref 15–55)
IRON SERPL-MCNC: 31 UG/DL (ref 40–170)
TIBC SERPL-MCNC: 456 UG/DL (ref 250–450)
UIBC SERPL-MCNC: 425 UG/DL (ref 110–370)

## 2025-08-26 PROCEDURE — 36415 COLL VENOUS BLD VENIPUNCTURE: CPT

## 2025-08-26 PROCEDURE — 83550 IRON BINDING TEST: CPT

## 2025-08-26 PROCEDURE — 82728 ASSAY OF FERRITIN: CPT

## 2025-08-26 PROCEDURE — 83540 ASSAY OF IRON: CPT

## 2025-08-27 ENCOUNTER — TELEPHONE (OUTPATIENT)
Dept: MATERNAL FETAL MEDICINE | Facility: MEDICAL CENTER | Age: 35
End: 2025-08-27
Payer: COMMERCIAL

## 2025-10-20 ENCOUNTER — APPOINTMENT (OUTPATIENT)
Dept: MATERNAL FETAL MEDICINE | Facility: MEDICAL CENTER | Age: 35
End: 2025-10-20
Payer: COMMERCIAL